# Patient Record
Sex: FEMALE | Employment: OTHER | URBAN - METROPOLITAN AREA
[De-identification: names, ages, dates, MRNs, and addresses within clinical notes are randomized per-mention and may not be internally consistent; named-entity substitution may affect disease eponyms.]

---

## 2022-11-04 ENCOUNTER — HOSPITAL ENCOUNTER (OUTPATIENT)
Age: 72
Setting detail: SPECIMEN
Discharge: HOME | End: 2022-11-04

## 2022-11-04 LAB
CHOLESTEROL, FASTING: 150 MG/DL
CHOLESTEROL/HDL RATIO: 3.4
HDLC SERPL-MCNC: 44 MG/DL
LDL CHOLESTEROL: 64 MG/DL (ref 0–130)
TRIGLYCERIDE, FASTING: 208 MG/DL

## 2024-01-30 ENCOUNTER — HOSPITAL ENCOUNTER (OUTPATIENT)
Age: 74
Setting detail: SPECIMEN
Discharge: HOME | End: 2024-01-30

## 2024-01-30 LAB
ALBUMIN SERPL-MCNC: 4.5 G/DL (ref 3.5–5.2)
ALBUMIN/GLOB SERPL: 1.7 {RATIO} (ref 1–2.5)
ALP SERPL-CCNC: 104 U/L (ref 35–104)
ALT SERPL-CCNC: 17 U/L (ref 5–33)
ANION GAP SERPL CALCULATED.3IONS-SCNC: 12 MMOL/L (ref 9–17)
AST SERPL-CCNC: 19 U/L
BASOPHILS # BLD: 0.05 K/UL (ref 0–0.2)
BASOPHILS NFR BLD: 1 % (ref 0–2)
BILIRUB DIRECT SERPL-MCNC: 0.2 MG/DL
BILIRUB INDIRECT SERPL-MCNC: 0.8 MG/DL (ref 0–1)
BILIRUB SERPL-MCNC: 1 MG/DL (ref 0.3–1.2)
BUN SERPL-MCNC: 24 MG/DL (ref 8–23)
CALCIUM SERPL-MCNC: 9.6 MG/DL (ref 8.6–10.4)
CHLORIDE SERPL-SCNC: 100 MMOL/L (ref 98–107)
CHOLEST SERPL-MCNC: 166 MG/DL
CHOLESTEROL/HDL RATIO: 3.7
CO2 SERPL-SCNC: 24 MMOL/L (ref 20–31)
CREAT SERPL-MCNC: 1.1 MG/DL (ref 0.5–0.9)
EOSINOPHIL # BLD: 0.08 K/UL (ref 0–0.44)
EOSINOPHILS RELATIVE PERCENT: 2 % (ref 1–4)
ERYTHROCYTE [DISTWIDTH] IN BLOOD BY AUTOMATED COUNT: 17.2 % (ref 11.8–14.4)
EST. AVERAGE GLUCOSE BLD GHB EST-MCNC: 108 MG/DL
GFR SERPL CREATININE-BSD FRML MDRD: 53 ML/MIN/1.73M2
GLUCOSE SERPL-MCNC: 105 MG/DL (ref 70–99)
HBA1C MFR BLD: 5.4 % (ref 4–6)
HCT VFR BLD AUTO: 42.8 % (ref 36.3–47.1)
HDLC SERPL-MCNC: 45 MG/DL
HGB BLD-MCNC: 13.1 G/DL (ref 11.9–15.1)
IMM GRANULOCYTES # BLD AUTO: <0.03 K/UL (ref 0–0.3)
IMM GRANULOCYTES NFR BLD: 0 %
LDLC SERPL CALC-MCNC: 76 MG/DL (ref 0–130)
LYMPHOCYTES NFR BLD: 1.32 K/UL (ref 1.1–3.7)
LYMPHOCYTES RELATIVE PERCENT: 26 % (ref 24–43)
MCH RBC QN AUTO: 25.9 PG (ref 25.2–33.5)
MCHC RBC AUTO-ENTMCNC: 30.6 G/DL (ref 28.4–34.8)
MCV RBC AUTO: 84.8 FL (ref 82.6–102.9)
MONOCYTES NFR BLD: 0.3 K/UL (ref 0.1–1.2)
MONOCYTES NFR BLD: 6 % (ref 3–12)
NEUTROPHILS NFR BLD: 65 % (ref 36–65)
NEUTS SEG NFR BLD: 3.37 K/UL (ref 1.5–8.1)
NRBC BLD-RTO: 0 PER 100 WBC
PLATELET # BLD AUTO: 194 K/UL (ref 138–453)
PMV BLD AUTO: 11.1 FL (ref 8.1–13.5)
POTASSIUM SERPL-SCNC: 4.7 MMOL/L (ref 3.7–5.3)
PROT SERPL-MCNC: 7.1 G/DL (ref 6.4–8.3)
RBC # BLD AUTO: 5.05 M/UL (ref 3.95–5.11)
RBC # BLD: ABNORMAL 10*6/UL
SODIUM SERPL-SCNC: 136 MMOL/L (ref 135–144)
TRIGL SERPL-MCNC: 227 MG/DL
TSH SERPL DL<=0.05 MIU/L-ACNC: 2.82 UIU/ML (ref 0.3–5)
WBC OTHER # BLD: 5.1 K/UL (ref 3.5–11.3)

## 2024-10-12 ENCOUNTER — APPOINTMENT (OUTPATIENT)
Dept: GENERAL RADIOLOGY | Age: 74
DRG: 481 | End: 2024-10-12
Payer: MEDICARE

## 2024-10-12 ENCOUNTER — HOSPITAL ENCOUNTER (INPATIENT)
Age: 74
LOS: 3 days | Discharge: NURSING, REHAB OR LONG-TERM CARE | DRG: 481 | End: 2024-10-15
Attending: EMERGENCY MEDICINE | Admitting: INTERNAL MEDICINE
Payer: MEDICARE

## 2024-10-12 ENCOUNTER — APPOINTMENT (OUTPATIENT)
Dept: CT IMAGING | Age: 74
DRG: 481 | End: 2024-10-12
Payer: MEDICARE

## 2024-10-12 DIAGNOSIS — R01.1 HEART MURMUR: ICD-10-CM
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
## 2024-10-12 DIAGNOSIS — S82.891A CLOSED FRACTURE OF RIGHT ANKLE, INITIAL ENCOUNTER: Primary | ICD-10-CM

## 2024-10-12 DIAGNOSIS — S72.044A CLOSED NONDISPLACED BASICERVICAL FRACTURE OF RIGHT FEMUR, INITIAL ENCOUNTER (HCC): ICD-10-CM

## 2024-10-12 PROBLEM — F41.9 ANXIETY: Status: ACTIVE | Noted: 2024-10-12

## 2024-10-12 PROBLEM — I25.10 ASCVD (ARTERIOSCLEROTIC CARDIOVASCULAR DISEASE): Status: ACTIVE | Noted: 2024-10-12

## 2024-10-12 PROBLEM — I47.10 SVT (SUPRAVENTRICULAR TACHYCARDIA) (HCC): Status: ACTIVE | Noted: 2024-10-12

## 2024-10-12 PROBLEM — S72.001A CLOSED FRACTURE OF NECK OF RIGHT FEMUR, INITIAL ENCOUNTER (HCC): Status: ACTIVE | Noted: 2024-10-12

## 2024-10-12 PROBLEM — G47.33 OBSTRUCTIVE SLEEP APNEA: Status: ACTIVE | Noted: 2024-10-12

## 2024-10-12 PROBLEM — I10 ESSENTIAL HYPERTENSION: Status: ACTIVE | Noted: 2024-10-12

## 2024-10-12 LAB
ANION GAP SERPL CALCULATED.3IONS-SCNC: 10 MMOL/L (ref 9–17)
BASOPHILS # BLD: 0.03 K/UL (ref 0–0.2)
BASOPHILS NFR BLD: 1 % (ref 0–2)
BUN SERPL-MCNC: 30 MG/DL (ref 8–23)
BUN/CREAT SERPL: 30 (ref 9–20)
CALCIUM SERPL-MCNC: 9.3 MG/DL (ref 8.6–10.4)
CHLORIDE SERPL-SCNC: 104 MMOL/L (ref 98–107)
CO2 SERPL-SCNC: 25 MMOL/L (ref 20–31)
CREAT SERPL-MCNC: 1 MG/DL (ref 0.5–0.9)
EOSINOPHIL # BLD: 0.04 K/UL (ref 0–0.44)
EOSINOPHILS RELATIVE PERCENT: 1 % (ref 1–4)
ERYTHROCYTE [DISTWIDTH] IN BLOOD BY AUTOMATED COUNT: 17.5 % (ref 11.8–14.4)
GFR, ESTIMATED: 59 ML/MIN/1.73M2
GLUCOSE SERPL-MCNC: 108 MG/DL (ref 70–99)
HCT VFR BLD AUTO: 36.7 % (ref 36.3–47.1)
HGB BLD-MCNC: 11.2 G/DL (ref 11.9–15.1)
IMM GRANULOCYTES # BLD AUTO: 0.01 K/UL (ref 0–0.3)
IMM GRANULOCYTES NFR BLD: 0 %
LYMPHOCYTES NFR BLD: 0.97 K/UL (ref 1.1–3.7)
LYMPHOCYTES RELATIVE PERCENT: 17 % (ref 24–43)
MCH RBC QN AUTO: 25.8 PG (ref 25.2–33.5)
MCHC RBC AUTO-ENTMCNC: 30.5 G/DL (ref 28.4–34.8)
MCV RBC AUTO: 84.6 FL (ref 82.6–102.9)
MONOCYTES NFR BLD: 0.31 K/UL (ref 0.1–1.2)
MONOCYTES NFR BLD: 5 % (ref 3–12)
NEUTROPHILS NFR BLD: 76 % (ref 36–65)
NEUTS SEG NFR BLD: 4.37 K/UL (ref 1.5–8.1)
NRBC BLD-RTO: 0 PER 100 WBC
PLATELET # BLD AUTO: 170 K/UL (ref 138–453)
PMV BLD AUTO: 10.4 FL (ref 8.1–13.5)
POTASSIUM SERPL-SCNC: 4.5 MMOL/L (ref 3.7–5.3)
RBC # BLD AUTO: 4.34 M/UL (ref 3.95–5.11)
RBC # BLD: ABNORMAL 10*6/UL
SODIUM SERPL-SCNC: 139 MMOL/L (ref 135–144)
WBC OTHER # BLD: 5.7 K/UL (ref 3.5–11.3)

## 2024-10-12 PROCEDURE — 99285 EMERGENCY DEPT VISIT HI MDM: CPT

## 2024-10-12 PROCEDURE — 6360000002 HC RX W HCPCS: Performed by: EMERGENCY MEDICINE

## 2024-10-12 PROCEDURE — 73610 X-RAY EXAM OF ANKLE: CPT

## 2024-10-12 PROCEDURE — 73030 X-RAY EXAM OF SHOULDER: CPT

## 2024-10-12 PROCEDURE — 6360000002 HC RX W HCPCS: Performed by: NURSE PRACTITIONER

## 2024-10-12 PROCEDURE — 1200000000 HC SEMI PRIVATE

## 2024-10-12 PROCEDURE — 2580000003 HC RX 258: Performed by: NURSE PRACTITIONER

## 2024-10-12 PROCEDURE — 73502 X-RAY EXAM HIP UNI 2-3 VIEWS: CPT

## 2024-10-12 PROCEDURE — 80048 BASIC METABOLIC PNL TOTAL CA: CPT

## 2024-10-12 PROCEDURE — 71045 X-RAY EXAM CHEST 1 VIEW: CPT

## 2024-10-12 PROCEDURE — 73552 X-RAY EXAM OF FEMUR 2/>: CPT

## 2024-10-12 PROCEDURE — 85025 COMPLETE CBC W/AUTO DIFF WBC: CPT

## 2024-10-12 PROCEDURE — 6370000000 HC RX 637 (ALT 250 FOR IP)

## 2024-10-12 PROCEDURE — 99223 1ST HOSP IP/OBS HIGH 75: CPT

## 2024-10-12 PROCEDURE — 73060 X-RAY EXAM OF HUMERUS: CPT

## 2024-10-12 RX ORDER — POLYETHYLENE GLYCOL 3350 17 G/17G
17 POWDER, FOR SOLUTION ORAL DAILY PRN
Status: DISCONTINUED | OUTPATIENT
Start: 2024-10-12 | End: 2024-10-15 | Stop reason: HOSPADM

## 2024-10-12 RX ORDER — POTASSIUM CHLORIDE 1500 MG/1
40 TABLET, EXTENDED RELEASE ORAL PRN
Status: DISCONTINUED | OUTPATIENT
Start: 2024-10-12 | End: 2024-10-15 | Stop reason: HOSPADM

## 2024-10-12 RX ORDER — SODIUM CHLORIDE 0.9 % (FLUSH) 0.9 %
10 SYRINGE (ML) INJECTION PRN
Status: DISCONTINUED | OUTPATIENT
Start: 2024-10-12 | End: 2024-10-15 | Stop reason: HOSPADM

## 2024-10-12 RX ORDER — METOPROLOL SUCCINATE 25 MG/1
25 TABLET, EXTENDED RELEASE ORAL DAILY
COMMUNITY

## 2024-10-12 RX ORDER — SODIUM CHLORIDE 9 MG/ML
INJECTION, SOLUTION INTRAVENOUS PRN
Status: DISCONTINUED | OUTPATIENT
Start: 2024-10-12 | End: 2024-10-15 | Stop reason: HOSPADM

## 2024-10-12 RX ORDER — CLOPIDOGREL BISULFATE 75 MG/1
75 TABLET ORAL DAILY
COMMUNITY

## 2024-10-12 RX ORDER — MONTELUKAST SODIUM 10 MG/1
10 TABLET ORAL NIGHTLY
Status: DISCONTINUED | OUTPATIENT
Start: 2024-10-12 | End: 2024-10-15 | Stop reason: HOSPADM

## 2024-10-12 RX ORDER — ASPIRIN 81 MG/1
81 TABLET ORAL DAILY
Status: DISCONTINUED | OUTPATIENT
Start: 2024-10-12 | End: 2024-10-15 | Stop reason: HOSPADM

## 2024-10-12 RX ORDER — ACETAMINOPHEN 650 MG/1
650 SUPPOSITORY RECTAL EVERY 6 HOURS PRN
Status: DISCONTINUED | OUTPATIENT
Start: 2024-10-12 | End: 2024-10-15 | Stop reason: HOSPADM

## 2024-10-12 RX ORDER — BUSPIRONE HYDROCHLORIDE 10 MG/1
10 TABLET ORAL 3 TIMES DAILY
Status: DISCONTINUED | OUTPATIENT
Start: 2024-10-12 | End: 2024-10-12

## 2024-10-12 RX ORDER — SODIUM CHLORIDE 0.9 % (FLUSH) 0.9 %
5-40 SYRINGE (ML) INJECTION EVERY 12 HOURS SCHEDULED
Status: DISCONTINUED | OUTPATIENT
Start: 2024-10-12 | End: 2024-10-15 | Stop reason: HOSPADM

## 2024-10-12 RX ORDER — ONDANSETRON 4 MG/1
4 TABLET, ORALLY DISINTEGRATING ORAL EVERY 8 HOURS PRN
Status: DISCONTINUED | OUTPATIENT
Start: 2024-10-12 | End: 2024-10-15 | Stop reason: HOSPADM

## 2024-10-12 RX ORDER — METOPROLOL SUCCINATE 25 MG/1
25 TABLET, EXTENDED RELEASE ORAL DAILY
Status: DISCONTINUED | OUTPATIENT
Start: 2024-10-12 | End: 2024-10-15 | Stop reason: HOSPADM

## 2024-10-12 RX ORDER — MORPHINE SULFATE 2 MG/ML
2 INJECTION, SOLUTION INTRAMUSCULAR; INTRAVENOUS ONCE
Status: COMPLETED | OUTPATIENT
Start: 2024-10-12 | End: 2024-10-12

## 2024-10-12 RX ORDER — MONTELUKAST SODIUM 10 MG/1
10 TABLET ORAL NIGHTLY
COMMUNITY

## 2024-10-12 RX ORDER — MORPHINE SULFATE 2 MG/ML
2 INJECTION, SOLUTION INTRAMUSCULAR; INTRAVENOUS EVERY 4 HOURS PRN
Status: DISCONTINUED | OUTPATIENT
Start: 2024-10-12 | End: 2024-10-15

## 2024-10-12 RX ORDER — ASPIRIN 81 MG/1
81 TABLET ORAL DAILY
COMMUNITY

## 2024-10-12 RX ORDER — BUSPIRONE HYDROCHLORIDE 10 MG/1
10 TABLET ORAL 2 TIMES DAILY
COMMUNITY

## 2024-10-12 RX ORDER — ENOXAPARIN SODIUM 100 MG/ML
30 INJECTION SUBCUTANEOUS 2 TIMES DAILY
Status: DISCONTINUED | OUTPATIENT
Start: 2024-10-12 | End: 2024-10-13

## 2024-10-12 RX ORDER — BUSPIRONE HYDROCHLORIDE 10 MG/1
10 TABLET ORAL 2 TIMES DAILY
Status: DISCONTINUED | OUTPATIENT
Start: 2024-10-13 | End: 2024-10-12

## 2024-10-12 RX ORDER — BUSPIRONE HYDROCHLORIDE 10 MG/1
10 TABLET ORAL 2 TIMES DAILY
Status: DISCONTINUED | OUTPATIENT
Start: 2024-10-12 | End: 2024-10-15 | Stop reason: HOSPADM

## 2024-10-12 RX ORDER — PANTOPRAZOLE SODIUM 40 MG/1
40 TABLET, DELAYED RELEASE ORAL DAILY
COMMUNITY

## 2024-10-12 RX ORDER — LORAZEPAM 0.5 MG/1
0.5 TABLET ORAL EVERY 6 HOURS PRN
Status: DISCONTINUED | OUTPATIENT
Start: 2024-10-12 | End: 2024-10-15 | Stop reason: HOSPADM

## 2024-10-12 RX ORDER — ROSUVASTATIN CALCIUM 5 MG/1
5 TABLET, COATED ORAL EVERY EVENING
COMMUNITY

## 2024-10-12 RX ORDER — ONDANSETRON 2 MG/ML
4 INJECTION INTRAMUSCULAR; INTRAVENOUS EVERY 6 HOURS PRN
Status: DISCONTINUED | OUTPATIENT
Start: 2024-10-12 | End: 2024-10-15 | Stop reason: HOSPADM

## 2024-10-12 RX ORDER — ENALAPRIL MALEATE AND HYDROCHLOROTHIAZIDE 10; 25 MG/1; MG/1
1 TABLET ORAL DAILY
Status: ON HOLD | COMMUNITY
End: 2024-10-15

## 2024-10-12 RX ORDER — ENALAPRIL MALEATE AND HYDROCHLOROTHIAZIDE 10; 25 MG/1; MG/1
1 TABLET ORAL DAILY
Status: DISCONTINUED | OUTPATIENT
Start: 2024-10-13 | End: 2024-10-13 | Stop reason: CLARIF

## 2024-10-12 RX ORDER — PANTOPRAZOLE SODIUM 40 MG/1
40 TABLET, DELAYED RELEASE ORAL DAILY
Status: DISCONTINUED | OUTPATIENT
Start: 2024-10-12 | End: 2024-10-15 | Stop reason: HOSPADM

## 2024-10-12 RX ORDER — MAGNESIUM SULFATE 1 G/100ML
1000 INJECTION INTRAVENOUS PRN
Status: DISCONTINUED | OUTPATIENT
Start: 2024-10-12 | End: 2024-10-15 | Stop reason: HOSPADM

## 2024-10-12 RX ORDER — POTASSIUM CHLORIDE 7.45 MG/ML
10 INJECTION INTRAVENOUS PRN
Status: DISCONTINUED | OUTPATIENT
Start: 2024-10-12 | End: 2024-10-15 | Stop reason: HOSPADM

## 2024-10-12 RX ORDER — CLOPIDOGREL BISULFATE 75 MG/1
75 TABLET ORAL DAILY
Status: DISCONTINUED | OUTPATIENT
Start: 2024-10-12 | End: 2024-10-14

## 2024-10-12 RX ORDER — ACETAMINOPHEN 325 MG/1
650 TABLET ORAL EVERY 6 HOURS PRN
Status: DISCONTINUED | OUTPATIENT
Start: 2024-10-12 | End: 2024-10-15 | Stop reason: HOSPADM

## 2024-10-12 RX ORDER — ROSUVASTATIN CALCIUM 5 MG/1
5 TABLET, COATED ORAL EVERY EVENING
Status: DISCONTINUED | OUTPATIENT
Start: 2024-10-12 | End: 2024-10-15 | Stop reason: HOSPADM

## 2024-10-12 RX ADMIN — PANTOPRAZOLE SODIUM 40 MG: 40 TABLET, DELAYED RELEASE ORAL at 22:14

## 2024-10-12 RX ADMIN — METOPROLOL SUCCINATE 25 MG: 25 TABLET, EXTENDED RELEASE ORAL at 22:13

## 2024-10-12 RX ADMIN — ROSUVASTATIN CALCIUM 5 MG: 5 TABLET, FILM COATED ORAL at 22:14

## 2024-10-12 RX ADMIN — MORPHINE SULFATE 2 MG: 2 INJECTION, SOLUTION INTRAMUSCULAR; INTRAVENOUS at 17:41

## 2024-10-12 RX ADMIN — SODIUM CHLORIDE, PRESERVATIVE FREE 10 ML: 5 INJECTION INTRAVENOUS at 20:51

## 2024-10-12 RX ADMIN — ENOXAPARIN SODIUM 30 MG: 100 INJECTION SUBCUTANEOUS at 20:57

## 2024-10-12 RX ADMIN — BUSPIRONE HYDROCHLORIDE 10 MG: 10 TABLET ORAL at 22:13

## 2024-10-12 RX ADMIN — MONTELUKAST 10 MG: 10 TABLET, FILM COATED ORAL at 22:13

## 2024-10-12 RX ADMIN — MORPHINE SULFATE 2 MG: 2 INJECTION, SOLUTION INTRAMUSCULAR; INTRAVENOUS at 20:49

## 2024-10-12 RX ADMIN — ASPIRIN 81 MG: 81 TABLET, COATED ORAL at 22:14

## 2024-10-12 ASSESSMENT — PAIN DESCRIPTION - LOCATION
LOCATION: LEG;ANKLE
LOCATION: LEG

## 2024-10-12 ASSESSMENT — PAIN SCALES - GENERAL
PAINLEVEL_OUTOF10: 10
PAINLEVEL_OUTOF10: 3
PAINLEVEL_OUTOF10: 8
PAINLEVEL_OUTOF10: 10

## 2024-10-12 ASSESSMENT — PAIN DESCRIPTION - ORIENTATION
ORIENTATION: RIGHT
ORIENTATION: RIGHT

## 2024-10-12 ASSESSMENT — PAIN DESCRIPTION - DESCRIPTORS
DESCRIPTORS: SHARP
DESCRIPTORS: ACHING;DISCOMFORT

## 2024-10-12 ASSESSMENT — PAIN - FUNCTIONAL ASSESSMENT: PAIN_FUNCTIONAL_ASSESSMENT: 0-10

## 2024-10-12 NOTE — ED PROVIDER NOTES
abnormality.   3. Mild degenerative change of the right AC joint.   Right hip:      1. Lucency at the right femoral neck.  Further evaluation with right hip   radiographs recommended to assess for a femoral neck fracture.   2. Mild bilateral hip osteoarthrosis.   3. Severe stool burden.   Right ankle:      1. Acute oblique distal fibular diaphyseal fracture.   2. Moderate soft tissue edema of the right leg.   3. Degenerative changes and sequela of remote trauma.   4. Severe plantar calcaneal spur.  Pes planus.         XR SHOULDER RIGHT (MIN 2 VIEWS)   Final Result   Chest:      1. Cardiomegaly.  No acute focal airspace consolidation.   2. Bilateral shoulder arthroplasty hardware.   Right shoulder:      1. Right shoulder arthroplasty.   2. Mild degenerative change of the right AC joint.   3. No significant periprosthetic lucency or acute osseous abnormality.   Right humerus:      1. Right shoulder arthroplasty.   2. No significant periprosthetic lucency or acute osseous abnormality.   3. Mild degenerative change of the right AC joint.   Right hip:      1. Lucency at the right femoral neck.  Further evaluation with right hip   radiographs recommended to assess for a femoral neck fracture.   2. Mild bilateral hip osteoarthrosis.   3. Severe stool burden.   Right ankle:      1. Acute oblique distal fibular diaphyseal fracture.   2. Moderate soft tissue edema of the right leg.   3. Degenerative changes and sequela of remote trauma.   4. Severe plantar calcaneal spur.  Pes planus.         XR ANKLE RIGHT (MIN 3 VIEWS)   Final Result   Chest:      1. Cardiomegaly.  No acute focal airspace consolidation.   2. Bilateral shoulder arthroplasty hardware.   Right shoulder:      1. Right shoulder arthroplasty.   2. Mild degenerative change of the right AC joint.   3. No significant periprosthetic lucency or acute osseous abnormality.   Right humerus:      1. Right shoulder arthroplasty.   2. No significant periprosthetic lucency

## 2024-10-13 PROBLEM — S82.891A CLOSED RIGHT ANKLE FRACTURE, INITIAL ENCOUNTER: Status: ACTIVE | Noted: 2024-10-13

## 2024-10-13 LAB
ANION GAP SERPL CALCULATED.3IONS-SCNC: 10 MMOL/L (ref 9–17)
BUN SERPL-MCNC: 25 MG/DL (ref 8–23)
BUN/CREAT SERPL: 23 (ref 9–20)
CALCIUM SERPL-MCNC: 8.8 MG/DL (ref 8.6–10.4)
CHLORIDE SERPL-SCNC: 105 MMOL/L (ref 98–107)
CO2 SERPL-SCNC: 23 MMOL/L (ref 20–31)
CREAT SERPL-MCNC: 1.1 MG/DL (ref 0.5–0.9)
GFR, ESTIMATED: 53 ML/MIN/1.73M2
GLUCOSE SERPL-MCNC: 117 MG/DL (ref 70–99)
INR PPP: 1.2
POTASSIUM SERPL-SCNC: 4.4 MMOL/L (ref 3.7–5.3)
PROTHROMBIN TIME: 14.8 SEC (ref 11.5–14.2)
SODIUM SERPL-SCNC: 138 MMOL/L (ref 135–144)

## 2024-10-13 PROCEDURE — 93005 ELECTROCARDIOGRAM TRACING: CPT | Performed by: INTERNAL MEDICINE

## 2024-10-13 PROCEDURE — 6360000002 HC RX W HCPCS: Performed by: NURSE PRACTITIONER

## 2024-10-13 PROCEDURE — 85610 PROTHROMBIN TIME: CPT

## 2024-10-13 PROCEDURE — 6370000000 HC RX 637 (ALT 250 FOR IP)

## 2024-10-13 PROCEDURE — 2580000003 HC RX 258: Performed by: NURSE PRACTITIONER

## 2024-10-13 PROCEDURE — 99232 SBSQ HOSP IP/OBS MODERATE 35: CPT | Performed by: NURSE PRACTITIONER

## 2024-10-13 PROCEDURE — 1200000000 HC SEMI PRIVATE

## 2024-10-13 PROCEDURE — 80048 BASIC METABOLIC PNL TOTAL CA: CPT

## 2024-10-13 PROCEDURE — 36415 COLL VENOUS BLD VENIPUNCTURE: CPT

## 2024-10-13 RX ORDER — SENNOSIDES A AND B 8.6 MG/1
2 TABLET, FILM COATED ORAL NIGHTLY
Status: DISCONTINUED | OUTPATIENT
Start: 2024-10-13 | End: 2024-10-15 | Stop reason: HOSPADM

## 2024-10-13 RX ORDER — OXYCODONE AND ACETAMINOPHEN 5; 325 MG/1; MG/1
1 TABLET ORAL EVERY 6 HOURS PRN
Status: DISCONTINUED | OUTPATIENT
Start: 2024-10-13 | End: 2024-10-15 | Stop reason: HOSPADM

## 2024-10-13 RX ORDER — DOCUSATE SODIUM 100 MG/1
100 CAPSULE, LIQUID FILLED ORAL 2 TIMES DAILY
Status: DISCONTINUED | OUTPATIENT
Start: 2024-10-13 | End: 2024-10-15 | Stop reason: HOSPADM

## 2024-10-13 RX ORDER — OXYCODONE AND ACETAMINOPHEN 5; 325 MG/1; MG/1
2 TABLET ORAL EVERY 6 HOURS PRN
Status: DISCONTINUED | OUTPATIENT
Start: 2024-10-13 | End: 2024-10-15 | Stop reason: HOSPADM

## 2024-10-13 RX ORDER — HYDROCHLOROTHIAZIDE 25 MG/1
25 TABLET ORAL DAILY
Status: DISCONTINUED | OUTPATIENT
Start: 2024-10-13 | End: 2024-10-15 | Stop reason: HOSPADM

## 2024-10-13 RX ORDER — ENOXAPARIN SODIUM 100 MG/ML
30 INJECTION SUBCUTANEOUS 2 TIMES DAILY
Status: DISCONTINUED | OUTPATIENT
Start: 2024-10-13 | End: 2024-10-14

## 2024-10-13 RX ORDER — ENALAPRIL MALEATE 10 MG/1
10 TABLET ORAL DAILY
Status: DISCONTINUED | OUTPATIENT
Start: 2024-10-13 | End: 2024-10-15 | Stop reason: HOSPADM

## 2024-10-13 RX ADMIN — MORPHINE SULFATE 2 MG: 2 INJECTION, SOLUTION INTRAMUSCULAR; INTRAVENOUS at 01:00

## 2024-10-13 RX ADMIN — ENALAPRIL MALEATE 10 MG: 10 TABLET ORAL at 09:17

## 2024-10-13 RX ADMIN — ASPIRIN 81 MG: 81 TABLET, COATED ORAL at 09:17

## 2024-10-13 RX ADMIN — ENOXAPARIN SODIUM 30 MG: 100 INJECTION SUBCUTANEOUS at 09:17

## 2024-10-13 RX ADMIN — SODIUM CHLORIDE, PRESERVATIVE FREE 10 ML: 5 INJECTION INTRAVENOUS at 09:18

## 2024-10-13 RX ADMIN — HYDROCHLOROTHIAZIDE 25 MG: 25 TABLET ORAL at 09:17

## 2024-10-13 RX ADMIN — BUSPIRONE HYDROCHLORIDE 10 MG: 10 TABLET ORAL at 20:25

## 2024-10-13 RX ADMIN — BUSPIRONE HYDROCHLORIDE 10 MG: 10 TABLET ORAL at 09:16

## 2024-10-13 RX ADMIN — MONTELUKAST 10 MG: 10 TABLET, FILM COATED ORAL at 20:25

## 2024-10-13 RX ADMIN — METOPROLOL SUCCINATE 25 MG: 25 TABLET, EXTENDED RELEASE ORAL at 09:16

## 2024-10-13 RX ADMIN — SENNOSIDES 17.2 MG: 8.6 TABLET, FILM COATED ORAL at 20:25

## 2024-10-13 RX ADMIN — DOCUSATE SODIUM 100 MG: 100 CAPSULE, LIQUID FILLED ORAL at 20:25

## 2024-10-13 RX ADMIN — SODIUM CHLORIDE, PRESERVATIVE FREE 10 ML: 5 INJECTION INTRAVENOUS at 22:54

## 2024-10-13 RX ADMIN — OXYCODONE AND ACETAMINOPHEN 2 TABLET: 5; 325 TABLET ORAL at 13:12

## 2024-10-13 RX ADMIN — ROSUVASTATIN CALCIUM 5 MG: 5 TABLET, FILM COATED ORAL at 20:25

## 2024-10-13 RX ADMIN — PANTOPRAZOLE SODIUM 40 MG: 40 TABLET, DELAYED RELEASE ORAL at 06:12

## 2024-10-13 ASSESSMENT — PAIN DESCRIPTION - FREQUENCY: FREQUENCY: CONTINUOUS

## 2024-10-13 ASSESSMENT — PAIN SCALES - GENERAL
PAINLEVEL_OUTOF10: 8
PAINLEVEL_OUTOF10: 7
PAINLEVEL_OUTOF10: 4
PAINLEVEL_OUTOF10: 3
PAINLEVEL_OUTOF10: 3

## 2024-10-13 ASSESSMENT — PAIN DESCRIPTION - DESCRIPTORS
DESCRIPTORS: ACHING;DISCOMFORT
DESCRIPTORS: ACHING;DISCOMFORT

## 2024-10-13 ASSESSMENT — PAIN DESCRIPTION - PAIN TYPE: TYPE: ACUTE PAIN

## 2024-10-13 ASSESSMENT — PAIN DESCRIPTION - ONSET: ONSET: ON-GOING

## 2024-10-13 ASSESSMENT — PAIN DESCRIPTION - LOCATION
LOCATION: ANKLE
LOCATION: HIP;ANKLE;LEG

## 2024-10-13 ASSESSMENT — PAIN DESCRIPTION - ORIENTATION
ORIENTATION: RIGHT
ORIENTATION: RIGHT

## 2024-10-13 ASSESSMENT — PAIN - FUNCTIONAL ASSESSMENT: PAIN_FUNCTIONAL_ASSESSMENT: PREVENTS OR INTERFERES SOME ACTIVE ACTIVITIES AND ADLS

## 2024-10-13 NOTE — CARE COORDINATION
Case Management Assessment  Initial Evaluation    Date/Time of Evaluation: 10/13/2024 3:36 PM  Assessment Completed by: CHAN CRUZ RN    If patient is discharged prior to next notation, then this note serves as note for discharge by case management.    Patient Name: Leticia George                   YOB: 1950  Diagnosis: Closed fracture of neck of right femur, initial encounter ([facility]) [S72.001A]  Closed fracture of right ankle, initial encounter [S82.891A]  Fall, initial encounter [W19.XXXA]  Closed nondisplaced basicervical fracture of right femur, initial encounter ([facility]) [S72.044A]                   Date / Time: 10/12/2024  2:16 PM    Patient Admission Status: Inpatient   Readmission Risk (Low < 19, Mod (19-27), High > 27): Readmission Risk Score: 13.4    Current PCP: Cortney Chaudhari MD  PCP verified by CM? Yes    Chart Reviewed: Yes      History Provided by: Patient  Patient Orientation: Alert and Oriented, Person, Place, Situation, Self    Patient Cognition: Alert    Hospitalization in the last 30 days (Readmission):  No    If yes, Readmission Assessment in  Navigator will be completed.    Advance Directives:      Code Status: Full Code   Patient's Primary Decision Maker is: Legal Next of Kin      Discharge Planning:    Patient lives with: Alone Type of Home: House  Primary Care Giver: Self  Patient Support Systems include: Family Members, Parent   Current Financial resources: None  Current community resources: None  Current services prior to admission: Durable Medical Equipment            Current DME: Walker, Cane (Raised toilet seat)            Type of Home Care services:  OT, PT, Skilled Therapy, Nursing Services, Aide Services    ADLS  Prior functional level: Independent in ADLs/IADLs  Current functional level: Assistance with the following:, Bathing, Dressing, Toileting, Cooking, Housework, Shopping, Mobility    PT AM-PAC:   /24  OT AM-PAC:   /24    Family can provide assistance

## 2024-10-13 NOTE — CONSULTS
Section of Cardiology   Consult Note      Reason for Consult: Preop clearance, CAD  Requesting Physician: Isai Lagunas MD    CHIEF COMPLAINT: Status post mechanical fall    History Obtained From:  patient, electronic medical record, patient's nurse    HISTORY OF PRESENT ILLNESS:      The patient is a 74 y.o. female with significant past medical history of CAD, obesity, PCI to the RCA who presents with mechanical fall without loss of consciousness.  The patient states that she felt weak in her knees and she fell to the floor and was found to have a hip fracture and was seen by Ortho service and clearance requested because the patient scheduled for surgery tomorrow.  Patient has limited activities mainly due to her overweight.  She has knees and shoulders problems.  She is known to have obstructive sleep apnea but did not tolerate using the mask.  She follows with [facility] but did not see her cardiologist lately.  She states she supposed to call and get appointment.  No recent cardiac testings.  Patient denies chest pain.  She admits to have chronic lower extremities edema and she tells me that she has lymphedema.  No PND or orthopnea.  No palpitation.  Previous diagnostic testing for coronary artery disease includes: cardiac catheterization, echocardiogram, persantine thallium.   Previous history of cardiac disease includes: ischemic heart disease, coronary artery disease, and coronary artery stent.    Coronary artery disease risk factors include: advanced age (older than 55 for men, 65 for women), dyslipidemia, obesity (BMI >= 30 kg/m2), and sedentary lifestyle.    Past Medical History:    Past Medical History:   Diagnosis Date    Atherosclerotic heart disease of native coronary artery without angina pectoris     Hyperlipidemia     Hypertension     Morbid obesity with body mass index (BMI) of 50.0 to 59.9 in adult     FRANNY (obstructive sleep apnea)     Presence of drug-eluting stent in right coronary artery

## 2024-10-13 NOTE — H&P
after mechanical fall where she states she was attempting to step out of the truck and missed her step. She denies hitting her head but does complain of right leg and right shoulder discomfort.  She does have a past medical history significant for CAD s/p RCA stenting x 4, hypertension, hyperlipidemia, SVT and an extensive Ortho history including artificial knee and shoulder repairs.  Upon arrival to the ED her blood pressure was 141/67 with a heart rate of 52.    Blood work in the ER but 9 with a hemoglobin of 11.2 and a creatinine of 1.0.  Chest x-ray returned with cardiomegaly.  Right shoulder and humerus x-ray revealed degenerative changes with arthroplasty.  Right hip revealed right femoral neck fracture, severe stool burden.  Right ankle x-ray revealed acute oblique distal fibular diaphyseal fracture and soft tissue edema and severe plantar calcaneal spur.  Both podiatry and orthopedics were consulted in the emergency department.  The patient was splinted under the recommendations with plans for hospital admission.  We were then asked to admit the patient to medicine services.    During interview, patient very tearful on examination and endorses feelings of anxiety as she is concerned about caring for her mother at discharge.  Additionally she voiced a history of RCA dissection.  Unable to find documentation of dissection via chart review however cardiology was consulted considering risk.    Past Medical History:     Past Medical History:   Diagnosis Date    Atherosclerotic heart disease of native coronary artery without angina pectoris     Hyperlipidemia     Hypertension     Morbid obesity with body mass index (BMI) of 50.0 to 59.9 in adult     FRANNY (obstructive sleep apnea)     Presence of drug-eluting stent in right coronary artery     SVT (supraventricular tachycardia) (HCC)         Past Surgical History:     No past surgical history on file.     Medications Prior to Admission:     Prior to Admission

## 2024-10-13 NOTE — DISCHARGE INSTRUCTIONS
Orthopaedic Instructions:  -Weight bearing status: toe touch weightbearing with the right leg in CAM Boot. OK to remove CAM boot for ankle range of motion and hygiene, but always wear CAM boot when ambulating.   -Do not remove Optifoam bandage (the large sealed \"Band-aid\"-like dressing) until your follow-up date in clinic. It is okay to shower with the Optifoam bandage. Should it fall off, replace with band-aids or gauze & tape until dry. It is still okay to shower if it falls off, but avoid baths and underwater submersion.  -Ice (20 minutes on and off 1 hour) to reduce swelling and throbbing pain.  -Call the office or come to Emergency Room if signs of infection appear (hot, swollen, red, draining pus, fever).  -Take medications as prescribed.  -Wean off narcotics (percocet/norco) as soon as possible. Do not take tylenol if still taking narcotics.  -Follow up with Dr. Mccracken's office 10/28/24 at 7:30am . Call [phone] to schedule/confirm or with any questions/concerns.        Podiatry:  Medications:  Take your prescriptions as directed  You are receiving medications for pain  If your pain is not severe then you may take the non-prescription medication that you normally take for aches and pains ie Tylenol and Ibuprofen (alternating)  You may resume your regularly scheduled medications (unless otherwise directed)  If any side effects or adverse reactions occur, discontinue the medication and contact your doctor.  Review the patient drug information that is provided before you take any medication    Ambulation and Activity:  Use assistive devices per PT/OT as needed  You may not put weight on the injured foot.  Leave posterior splint intact until follow up. Do not walk on the right foot.  Avoid stairs.  Do not lift or move heavy objects  Do not drive until cleared by your physician    Bandage and Wound Care Instructions:  Keep bandage clean and dry  Do NOT remove dressing/ splint  DO NOT get wet  Please use shower

## 2024-10-13 NOTE — DISCHARGE INSTR - COC
oriented    IV Access:  - None    Nursing Mobility/ADLs:  Walking   Dependent  Transfer  Assisted  Bathing  Assisted  Dressing  Assisted  Toileting  Assisted  Feeding  Independent  Med Admin  Assisted  Med Delivery   whole    Wound Care Documentation and Therapy:        Elimination:  Continence:   Bowel: Yes  Bladder: Yes  Urinary Catheter: None   Colostomy/Ileostomy/Ileal Conduit: No       Date of Last BM: 10/10/2024  No intake or output data in the 24 hours ending 10/13/24 0916  No intake/output data recorded.    Safety Concerns:     History of Falls (last 30 days) and At Risk for Falls    Impairments/Disabilities:      None    Nutrition Therapy:  Current Nutrition Therapy:   - Oral Diet:  General    Routes of Feeding: Oral  Liquids: No Restrictions  Daily Fluid Restriction: no  Last Modified Barium Swallow with Video (Video Swallowing Test): not done    Treatments at the Time of Hospital Discharge:   Respiratory Treatments:   Oxygen Therapy:  is not on home oxygen therapy.  Ventilator:    - No ventilator support    Rehab Therapies: Physical Therapy and Occupational Therapy  Weight Bearing Status/Restrictions: Touchdown weight bearing (10-25 lbs) only on right leg, with cam boot  Other Medical Equipment (for information only, NOT a DME order):  walker and cam boot  Other Treatments: CHG daily to incision site. OK to remove CAM boot for ankle ROM and hygiene.      Patient's personal belongings (please select all that are sent with patient):  Cell phone, clothing     RN SIGNATURE:  Electronically signed by Silvia Jonas RN on 10/15/24 at 8:55 AM EDT    CASE MANAGEMENT/SOCIAL WORK SECTION    Inpatient Status Date: ***    Readmission Risk Assessment Score:  Readmission Risk              Risk of Unplanned Readmission:  12           Discharging to Facility/ Agency   Name: [facility]  Address:[facility]  Phone:[phone]  Fax:[phone]      / signature: Electronically

## 2024-10-14 ENCOUNTER — ANESTHESIA (OUTPATIENT)
Dept: OPERATING ROOM | Age: 74
DRG: 481 | End: 2024-10-14
Payer: MEDICARE

## 2024-10-14 ENCOUNTER — ANESTHESIA EVENT (OUTPATIENT)
Dept: OPERATING ROOM | Age: 74
DRG: 481 | End: 2024-10-14
Payer: MEDICARE

## 2024-10-14 ENCOUNTER — APPOINTMENT (OUTPATIENT)
Dept: GENERAL RADIOLOGY | Age: 74
DRG: 481 | End: 2024-10-14
Payer: MEDICARE

## 2024-10-14 ENCOUNTER — APPOINTMENT (OUTPATIENT)
Age: 74
DRG: 481 | End: 2024-10-14
Payer: MEDICARE

## 2024-10-14 PROBLEM — S82.891A CLOSED FRACTURE OF RIGHT ANKLE: Status: ACTIVE | Noted: 2024-10-14

## 2024-10-14 PROBLEM — S82.61XA CLOSED LOW LATERAL MALLEOLUS FRACTURE, RIGHT, INITIAL ENCOUNTER: Status: ACTIVE | Noted: 2024-10-14

## 2024-10-14 LAB
ABO + RH BLD: NORMAL
ARM BAND NUMBER: NORMAL
BLOOD BANK SAMPLE EXPIRATION: NORMAL
BLOOD GROUP ANTIBODIES SERPL: NEGATIVE
ECHO AO ROOT DIAM: 2.8 CM
ECHO AO ROOT INDEX: 1.15 CM/M2
ECHO AV PEAK GRADIENT: 9 MMHG
ECHO AV PEAK VELOCITY: 1.5 M/S
ECHO BSA: 2.62 M2
ECHO EST RA PRESSURE: 3 MMHG
ECHO LA AREA 4C: 22.3 CM2
ECHO LA DIAMETER INDEX: 1.68 CM/M2
ECHO LA DIAMETER: 4.1 CM
ECHO LA MAJOR AXIS: 6.7 CM
ECHO LA TO AORTIC ROOT RATIO: 1.46
ECHO LA VOL MOD A4C: 60 ML (ref 22–52)
ECHO LA VOLUME INDEX MOD A4C: 25 ML/M2 (ref 16–34)
ECHO LV E' LATERAL VELOCITY: 11.4 CM/S
ECHO LV E' SEPTAL VELOCITY: 6 CM/S
ECHO LV EDV A4C: 92 ML
ECHO LV EDV INDEX A4C: 38 ML/M2
ECHO LV EJECTION FRACTION A4C: 70 %
ECHO LV EJECTION FRACTION BIPLANE: 70 % (ref 55–100)
ECHO LV ESV A4C: 28 ML
ECHO LV ESV INDEX A4C: 11 ML/M2
ECHO LV FRACTIONAL SHORTENING: 34 % (ref 28–44)
ECHO LV INTERNAL DIMENSION DIASTOLE INDEX: 2.05 CM/M2
ECHO LV INTERNAL DIMENSION DIASTOLIC: 5 CM (ref 3.9–5.3)
ECHO LV INTERNAL DIMENSION SYSTOLIC INDEX: 1.35 CM/M2
ECHO LV INTERNAL DIMENSION SYSTOLIC: 3.3 CM
ECHO LV IVSD: 1.2 CM (ref 0.6–0.9)
ECHO LV MASS 2D: 233.7 G (ref 67–162)
ECHO LV MASS INDEX 2D: 95.8 G/M2 (ref 43–95)
ECHO LV POSTERIOR WALL DIASTOLIC: 1.2 CM (ref 0.6–0.9)
ECHO LV RELATIVE WALL THICKNESS RATIO: 0.48
ECHO MV A VELOCITY: 0.96 M/S
ECHO MV E DECELERATION TIME (DT): 331 MS
ECHO MV E VELOCITY: 0.67 M/S
ECHO MV E/A RATIO: 0.7
ECHO MV E/E' LATERAL: 5.88
ECHO MV E/E' RATIO (AVERAGED): 8.52
ECHO MV E/E' SEPTAL: 11.17
ECHO RIGHT VENTRICULAR SYSTOLIC PRESSURE (RVSP): 13 MMHG
ECHO TV REGURGITANT MAX VELOCITY: 1.62 M/S
ECHO TV REGURGITANT PEAK GRADIENT: 10 MMHG
EKG ATRIAL RATE: 56 BPM
EKG P AXIS: 28 DEGREES
EKG P-R INTERVAL: 178 MS
EKG Q-T INTERVAL: 404 MS
EKG QRS DURATION: 72 MS
EKG QTC CALCULATION (BAZETT): 389 MS
EKG R AXIS: 30 DEGREES
EKG T AXIS: 22 DEGREES
EKG VENTRICULAR RATE: 56 BPM

## 2024-10-14 PROCEDURE — C1769 GUIDE WIRE: HCPCS | Performed by: ORTHOPAEDIC SURGERY

## 2024-10-14 PROCEDURE — 2580000003 HC RX 258: Performed by: NURSE ANESTHETIST, CERTIFIED REGISTERED

## 2024-10-14 PROCEDURE — 3600000012 HC SURGERY LEVEL 2 ADDTL 15MIN: Performed by: ORTHOPAEDIC SURGERY

## 2024-10-14 PROCEDURE — 86850 RBC ANTIBODY SCREEN: CPT

## 2024-10-14 PROCEDURE — 6360000002 HC RX W HCPCS: Performed by: NURSE ANESTHETIST, CERTIFIED REGISTERED

## 2024-10-14 PROCEDURE — 86901 BLOOD TYPING SEROLOGIC RH(D): CPT

## 2024-10-14 PROCEDURE — 2709999900 HC NON-CHARGEABLE SUPPLY: Performed by: ORTHOPAEDIC SURGERY

## 2024-10-14 PROCEDURE — 6370000000 HC RX 637 (ALT 250 FOR IP): Performed by: CHIROPRACTOR

## 2024-10-14 PROCEDURE — 2720000010 HC SURG SUPPLY STERILE: Performed by: ORTHOPAEDIC SURGERY

## 2024-10-14 PROCEDURE — 7100000001 HC PACU RECOVERY - ADDTL 15 MIN: Performed by: ORTHOPAEDIC SURGERY

## 2024-10-14 PROCEDURE — 0QS634Z REPOSITION RIGHT UPPER FEMUR WITH INTERNAL FIXATION DEVICE, PERCUTANEOUS APPROACH: ICD-10-PCS | Performed by: ORTHOPAEDIC SURGERY

## 2024-10-14 PROCEDURE — 3700000001 HC ADD 15 MINUTES (ANESTHESIA): Performed by: ORTHOPAEDIC SURGERY

## 2024-10-14 PROCEDURE — 7100000000 HC PACU RECOVERY - FIRST 15 MIN: Performed by: ORTHOPAEDIC SURGERY

## 2024-10-14 PROCEDURE — 2500000003 HC RX 250 WO HCPCS: Performed by: NURSE ANESTHETIST, CERTIFIED REGISTERED

## 2024-10-14 PROCEDURE — 2580000003 HC RX 258: Performed by: CHIROPRACTOR

## 2024-10-14 PROCEDURE — 3700000000 HC ANESTHESIA ATTENDED CARE: Performed by: ORTHOPAEDIC SURGERY

## 2024-10-14 PROCEDURE — 99232 SBSQ HOSP IP/OBS MODERATE 35: CPT | Performed by: STUDENT IN AN ORGANIZED HEALTH CARE EDUCATION/TRAINING PROGRAM

## 2024-10-14 PROCEDURE — C1713 ANCHOR/SCREW BN/BN,TIS/BN: HCPCS | Performed by: ORTHOPAEDIC SURGERY

## 2024-10-14 PROCEDURE — 36415 COLL VENOUS BLD VENIPUNCTURE: CPT

## 2024-10-14 PROCEDURE — 6360000002 HC RX W HCPCS: Performed by: ANESTHESIOLOGY

## 2024-10-14 PROCEDURE — 86900 BLOOD TYPING SEROLOGIC ABO: CPT

## 2024-10-14 PROCEDURE — 6370000000 HC RX 637 (ALT 250 FOR IP): Performed by: NURSE PRACTITIONER

## 2024-10-14 PROCEDURE — 6370000000 HC RX 637 (ALT 250 FOR IP)

## 2024-10-14 PROCEDURE — 93306 TTE W/DOPPLER COMPLETE: CPT

## 2024-10-14 PROCEDURE — 2580000003 HC RX 258: Performed by: NURSE PRACTITIONER

## 2024-10-14 PROCEDURE — 6360000002 HC RX W HCPCS: Performed by: CHIROPRACTOR

## 2024-10-14 PROCEDURE — 3600000002 HC SURGERY LEVEL 2 BASE: Performed by: ORTHOPAEDIC SURGERY

## 2024-10-14 PROCEDURE — 1200000000 HC SEMI PRIVATE

## 2024-10-14 PROCEDURE — 73502 X-RAY EXAM HIP UNI 2-3 VIEWS: CPT

## 2024-10-14 DEVICE — SCREW BNE L90MM DIA6.5MM THRD L32MM S STL CANN HEX SOCK: Type: IMPLANTABLE DEVICE | Site: HIP | Status: FUNCTIONAL

## 2024-10-14 DEVICE — SCREW BNE L100MM DIA6.5MM THRD L32MM S STL CANN HEX SOCK: Type: IMPLANTABLE DEVICE | Site: HIP | Status: FUNCTIONAL

## 2024-10-14 DEVICE — SCREW BNE L95MM DIA6.5MM THRD L32MM HD DIA7.9MM S STL CANC: Type: IMPLANTABLE DEVICE | Site: HIP | Status: FUNCTIONAL

## 2024-10-14 RX ORDER — OXYCODONE HYDROCHLORIDE 5 MG/1
5 TABLET ORAL
Status: DISCONTINUED | OUTPATIENT
Start: 2024-10-14 | End: 2024-10-14 | Stop reason: HOSPADM

## 2024-10-14 RX ORDER — NALOXONE HYDROCHLORIDE 0.4 MG/ML
INJECTION, SOLUTION INTRAMUSCULAR; INTRAVENOUS; SUBCUTANEOUS PRN
Status: DISCONTINUED | OUTPATIENT
Start: 2024-10-14 | End: 2024-10-14 | Stop reason: HOSPADM

## 2024-10-14 RX ORDER — EPHEDRINE SULFATE/0.9% NACL/PF 25 MG/5 ML
SYRINGE (ML) INTRAVENOUS
Status: DISCONTINUED | OUTPATIENT
Start: 2024-10-14 | End: 2024-10-14 | Stop reason: SDUPTHER

## 2024-10-14 RX ORDER — SODIUM CHLORIDE 0.9 % (FLUSH) 0.9 %
5-40 SYRINGE (ML) INJECTION EVERY 12 HOURS SCHEDULED
Status: DISCONTINUED | OUTPATIENT
Start: 2024-10-14 | End: 2024-10-14 | Stop reason: HOSPADM

## 2024-10-14 RX ORDER — SODIUM CHLORIDE, SODIUM LACTATE, POTASSIUM CHLORIDE, CALCIUM CHLORIDE 600; 310; 30; 20 MG/100ML; MG/100ML; MG/100ML; MG/100ML
INJECTION, SOLUTION INTRAVENOUS
Status: DISCONTINUED | OUTPATIENT
Start: 2024-10-14 | End: 2024-10-14 | Stop reason: SDUPTHER

## 2024-10-14 RX ORDER — MIDAZOLAM HYDROCHLORIDE 1 MG/ML
INJECTION INTRAMUSCULAR; INTRAVENOUS
Status: DISCONTINUED | OUTPATIENT
Start: 2024-10-14 | End: 2024-10-14 | Stop reason: SDUPTHER

## 2024-10-14 RX ORDER — CEFAZOLIN SODIUM 1 G/3ML
INJECTION, POWDER, FOR SOLUTION INTRAMUSCULAR; INTRAVENOUS
Status: DISCONTINUED | OUTPATIENT
Start: 2024-10-14 | End: 2024-10-14 | Stop reason: SDUPTHER

## 2024-10-14 RX ORDER — LIDOCAINE HYDROCHLORIDE 20 MG/ML
INJECTION, SOLUTION EPIDURAL; INFILTRATION; INTRACAUDAL; PERINEURAL
Status: DISCONTINUED | OUTPATIENT
Start: 2024-10-14 | End: 2024-10-14 | Stop reason: SDUPTHER

## 2024-10-14 RX ORDER — SODIUM CHLORIDE 0.9 % (FLUSH) 0.9 %
5-40 SYRINGE (ML) INJECTION PRN
Status: DISCONTINUED | OUTPATIENT
Start: 2024-10-14 | End: 2024-10-14 | Stop reason: HOSPADM

## 2024-10-14 RX ORDER — DEXAMETHASONE SODIUM PHOSPHATE 10 MG/ML
INJECTION INTRAMUSCULAR; INTRAVENOUS
Status: DISCONTINUED | OUTPATIENT
Start: 2024-10-14 | End: 2024-10-14 | Stop reason: SDUPTHER

## 2024-10-14 RX ORDER — SODIUM CHLORIDE 9 MG/ML
INJECTION, SOLUTION INTRAVENOUS PRN
Status: DISCONTINUED | OUTPATIENT
Start: 2024-10-14 | End: 2024-10-14 | Stop reason: HOSPADM

## 2024-10-14 RX ORDER — ROCURONIUM BROMIDE 10 MG/ML
INJECTION, SOLUTION INTRAVENOUS
Status: DISCONTINUED | OUTPATIENT
Start: 2024-10-14 | End: 2024-10-14 | Stop reason: SDUPTHER

## 2024-10-14 RX ORDER — HYDROMORPHONE HYDROCHLORIDE 1 MG/ML
0.5 INJECTION, SOLUTION INTRAMUSCULAR; INTRAVENOUS; SUBCUTANEOUS EVERY 5 MIN PRN
Status: DISCONTINUED | OUTPATIENT
Start: 2024-10-14 | End: 2024-10-14 | Stop reason: HOSPADM

## 2024-10-14 RX ORDER — PROPOFOL 10 MG/ML
INJECTION, EMULSION INTRAVENOUS
Status: DISCONTINUED | OUTPATIENT
Start: 2024-10-14 | End: 2024-10-14 | Stop reason: SDUPTHER

## 2024-10-14 RX ORDER — ONDANSETRON 2 MG/ML
INJECTION INTRAMUSCULAR; INTRAVENOUS
Status: DISCONTINUED | OUTPATIENT
Start: 2024-10-14 | End: 2024-10-14 | Stop reason: SDUPTHER

## 2024-10-14 RX ORDER — METOCLOPRAMIDE HYDROCHLORIDE 5 MG/ML
10 INJECTION INTRAMUSCULAR; INTRAVENOUS
Status: DISCONTINUED | OUTPATIENT
Start: 2024-10-14 | End: 2024-10-14 | Stop reason: HOSPADM

## 2024-10-14 RX ORDER — TRANEXAMIC ACID 100 MG/ML
INJECTION, SOLUTION INTRAVENOUS
Status: DISPENSED
Start: 2024-10-14 | End: 2024-10-15

## 2024-10-14 RX ORDER — FENTANYL CITRATE 50 UG/ML
INJECTION, SOLUTION INTRAMUSCULAR; INTRAVENOUS
Status: DISCONTINUED | OUTPATIENT
Start: 2024-10-14 | End: 2024-10-14 | Stop reason: SDUPTHER

## 2024-10-14 RX ORDER — FENTANYL CITRATE 50 UG/ML
25 INJECTION, SOLUTION INTRAMUSCULAR; INTRAVENOUS EVERY 5 MIN PRN
Status: DISCONTINUED | OUTPATIENT
Start: 2024-10-14 | End: 2024-10-14 | Stop reason: HOSPADM

## 2024-10-14 RX ORDER — BUPIVACAINE HYDROCHLORIDE AND EPINEPHRINE 5; 5 MG/ML; UG/ML
INJECTION, SOLUTION EPIDURAL; INTRACAUDAL; PERINEURAL
Status: DISPENSED
Start: 2024-10-14 | End: 2024-10-15

## 2024-10-14 RX ORDER — HALOPERIDOL 5 MG/ML
1 INJECTION INTRAMUSCULAR
Status: DISCONTINUED | OUTPATIENT
Start: 2024-10-14 | End: 2024-10-14 | Stop reason: HOSPADM

## 2024-10-14 RX ADMIN — HYDROMORPHONE HYDROCHLORIDE 0.5 MG: 1 INJECTION, SOLUTION INTRAMUSCULAR; INTRAVENOUS; SUBCUTANEOUS at 17:23

## 2024-10-14 RX ADMIN — PROPOFOL 80 MG: 10 INJECTION, EMULSION INTRAVENOUS at 16:56

## 2024-10-14 RX ADMIN — BUSPIRONE HYDROCHLORIDE 10 MG: 10 TABLET ORAL at 21:27

## 2024-10-14 RX ADMIN — ENALAPRIL MALEATE 10 MG: 10 TABLET ORAL at 08:41

## 2024-10-14 RX ADMIN — FENTANYL CITRATE 100 MCG: 50 INJECTION INTRAMUSCULAR; INTRAVENOUS at 15:48

## 2024-10-14 RX ADMIN — DOCUSATE SODIUM 100 MG: 100 CAPSULE, LIQUID FILLED ORAL at 21:27

## 2024-10-14 RX ADMIN — LORAZEPAM 0.5 MG: 0.5 TABLET ORAL at 18:15

## 2024-10-14 RX ADMIN — LORAZEPAM 0.5 MG: 0.5 TABLET ORAL at 08:41

## 2024-10-14 RX ADMIN — SODIUM CHLORIDE, POTASSIUM CHLORIDE, SODIUM LACTATE AND CALCIUM CHLORIDE: 600; 310; 30; 20 INJECTION, SOLUTION INTRAVENOUS at 15:38

## 2024-10-14 RX ADMIN — METOPROLOL SUCCINATE 25 MG: 25 TABLET, EXTENDED RELEASE ORAL at 08:41

## 2024-10-14 RX ADMIN — Medication 3000 MG: at 23:41

## 2024-10-14 RX ADMIN — EPHEDRINE SULFATE 5 MG: 5 INJECTION INTRAVENOUS at 15:53

## 2024-10-14 RX ADMIN — SENNOSIDES 17.2 MG: 8.6 TABLET, FILM COATED ORAL at 21:27

## 2024-10-14 RX ADMIN — DOCUSATE SODIUM 100 MG: 100 CAPSULE, LIQUID FILLED ORAL at 08:41

## 2024-10-14 RX ADMIN — DEXAMETHASONE SODIUM PHOSPHATE 10 MG: 10 INJECTION INTRAMUSCULAR; INTRAVENOUS at 15:54

## 2024-10-14 RX ADMIN — PANTOPRAZOLE SODIUM 40 MG: 40 TABLET, DELAYED RELEASE ORAL at 06:05

## 2024-10-14 RX ADMIN — HYDROMORPHONE HYDROCHLORIDE 0.5 MG: 1 INJECTION, SOLUTION INTRAMUSCULAR; INTRAVENOUS; SUBCUTANEOUS at 17:36

## 2024-10-14 RX ADMIN — LIDOCAINE HYDROCHLORIDE 80 MG: 20 INJECTION, SOLUTION EPIDURAL; INFILTRATION; INTRACAUDAL; PERINEURAL at 15:48

## 2024-10-14 RX ADMIN — ROSUVASTATIN CALCIUM 5 MG: 5 TABLET, FILM COATED ORAL at 18:16

## 2024-10-14 RX ADMIN — EPHEDRINE SULFATE 10 MG: 5 INJECTION INTRAVENOUS at 16:00

## 2024-10-14 RX ADMIN — EPHEDRINE SULFATE 10 MG: 5 INJECTION INTRAVENOUS at 16:32

## 2024-10-14 RX ADMIN — CEFAZOLIN 3 G: 1 INJECTION, POWDER, FOR SOLUTION INTRAMUSCULAR; INTRAVENOUS at 15:55

## 2024-10-14 RX ADMIN — OXYCODONE AND ACETAMINOPHEN 2 TABLET: 5; 325 TABLET ORAL at 04:42

## 2024-10-14 RX ADMIN — SODIUM CHLORIDE, PRESERVATIVE FREE 10 ML: 5 INJECTION INTRAVENOUS at 08:42

## 2024-10-14 RX ADMIN — SUGAMMADEX 500 MG: 100 INJECTION, SOLUTION INTRAVENOUS at 16:58

## 2024-10-14 RX ADMIN — MIDAZOLAM 2 MG: 1 INJECTION INTRAMUSCULAR; INTRAVENOUS at 15:38

## 2024-10-14 RX ADMIN — ONDANSETRON 4 MG: 2 INJECTION, SOLUTION INTRAMUSCULAR; INTRAVENOUS at 16:50

## 2024-10-14 RX ADMIN — HYDROCHLOROTHIAZIDE 25 MG: 25 TABLET ORAL at 08:40

## 2024-10-14 RX ADMIN — BUSPIRONE HYDROCHLORIDE 10 MG: 10 TABLET ORAL at 08:41

## 2024-10-14 RX ADMIN — ROCURONIUM BROMIDE 50 MG: 10 INJECTION, SOLUTION INTRAVENOUS at 15:49

## 2024-10-14 RX ADMIN — PROPOFOL 120 MG: 10 INJECTION, EMULSION INTRAVENOUS at 15:48

## 2024-10-14 RX ADMIN — MONTELUKAST 10 MG: 10 TABLET, FILM COATED ORAL at 21:27

## 2024-10-14 RX ADMIN — ACETAMINOPHEN 650 MG: 325 TABLET ORAL at 08:41

## 2024-10-14 RX ADMIN — SODIUM CHLORIDE, PRESERVATIVE FREE 10 ML: 5 INJECTION INTRAVENOUS at 21:27

## 2024-10-14 RX ADMIN — SODIUM CHLORIDE, PRESERVATIVE FREE 10 ML: 5 INJECTION INTRAVENOUS at 23:41

## 2024-10-14 ASSESSMENT — ENCOUNTER SYMPTOMS
SHORTNESS OF BREATH: 0
CONSTIPATION: 1
CHEST TIGHTNESS: 0
ABDOMINAL PAIN: 0
VOMITING: 0
DIARRHEA: 0
NAUSEA: 0
COUGH: 0

## 2024-10-14 ASSESSMENT — PAIN SCALES - GENERAL
PAINLEVEL_OUTOF10: 3
PAINLEVEL_OUTOF10: 4
PAINLEVEL_OUTOF10: 8
PAINLEVEL_OUTOF10: 0
PAINLEVEL_OUTOF10: 7
PAINLEVEL_OUTOF10: 8
PAINLEVEL_OUTOF10: 0
PAINLEVEL_OUTOF10: 0
PAINLEVEL_OUTOF10: 2
PAINLEVEL_OUTOF10: 3
PAINLEVEL_OUTOF10: 4

## 2024-10-14 ASSESSMENT — PAIN DESCRIPTION - DESCRIPTORS
DESCRIPTORS: ACHING;DISCOMFORT
DESCRIPTORS: ACHING
DESCRIPTORS: PATIENT UNABLE TO DESCRIBE

## 2024-10-14 ASSESSMENT — PAIN DESCRIPTION - ORIENTATION
ORIENTATION: RIGHT;LEFT;ANTERIOR
ORIENTATION: RIGHT

## 2024-10-14 ASSESSMENT — PAIN - FUNCTIONAL ASSESSMENT
PAIN_FUNCTIONAL_ASSESSMENT: ACTIVITIES ARE NOT PREVENTED
PAIN_FUNCTIONAL_ASSESSMENT: FACE, LEGS, ACTIVITY, CRY, AND CONSOLABILITY (FLACC)

## 2024-10-14 ASSESSMENT — PAIN DESCRIPTION - LOCATION
LOCATION: HEAD
LOCATION: ANKLE;HIP
LOCATION: LEG

## 2024-10-14 NOTE — CARE COORDINATION
Social work: spoke to pt who decided on 1. [facility] and 2 [facility] are her top 2 choices for rehab Referrals sent to both. Will need rodriguez when ready. Tyra mcleanw

## 2024-10-14 NOTE — BRIEF OP NOTE
Brief Postoperative Note      Patient: Leticia George  YOB: 1950  MRN: [id number]    Date of Procedure: 10/14/2024    Pre-Op Diagnosis Codes:      Closed right femoral neck fracture, right lateral malleolus fracture    Post-Op Diagnosis: Closed right femoral neck fracture, right lateral malleolus fracture       Procedure(s):  CLOSED REDUCTION PERCUTANEOUS SCREW FIXATION RIGHT HIP fracture, nonoperative nonmanipulative treatment right lateral malleolus fracture    Surgeon(s):  Paddy Mccracken DO    Assistant:  Resident: Dalton Oneal DO    Anesthesia: General    Estimated Blood Loss (mL): 10    Complications: None    Specimens:   * No specimens in log *    Implants:  * No implants in log *      Drains:   External Urinary Catheter (Active)   Site Assessment Clean,dry & intact 10/14/24 1200   Placement Replaced 10/13/24 2104   Securement Method Other (Comment) 10/13/24 2104   Catheter Care Catheter/Wick replaced 10/14/24 1040   Perineal Care Yes 10/14/24 1040   Output (mL) 450 mL 10/14/24 1040       Findings:  Infection Present At Time Of Surgery (PATOS) (choose all levels that have infection present):  No infection present  Other Findings: Essentially nondisplaced right femoral neck fracture, nondisplaced right lateral malleolus fracture    Electronically signed by Paddy Mccracken DO on 10/14/2024 at 4:56 PM

## 2024-10-14 NOTE — ANESTHESIA POSTPROCEDURE EVALUATION
Department of Anesthesiology  Postprocedure Note    Patient: Leticia George  MRN: [id number]  YOB: 1950  Date of evaluation: 10/14/2024    Procedure Summary       Date: 10/14/24 Room / Location: [address]    Anesthesia Start: 1538 Anesthesia Stop: 1711    Procedure: CLOSED REDUCTION PERCUTANEOUS SCREW FIXATION RIGHT HIP (Right: Hip) Diagnosis:       Type I or II open fracture of right hip, sequela      (Type I or II open fracture of right hip, sequela [S72.001S])    Surgeons: Paddy Mccracken DO Responsible Provider: Mariely Smith MD    Anesthesia Type: General ASA Status: 4            Anesthesia Type: General    Iraida Phase I: Iraida Score: 10    Iraida Phase II:      Anesthesia Post Evaluation    Patient location during evaluation: PACU  Patient participation: complete - patient participated  Level of consciousness: awake  Airway patency: patent  Nausea & Vomiting: no nausea  Cardiovascular status: blood pressure returned to baseline  Respiratory status: acceptable  Hydration status: euvolemic  Comments: Multimodal analgesia pain management as indicated by procedure  Multimodal analgesia pain management approach  Pain management: adequate    No notable events documented.

## 2024-10-14 NOTE — CARE COORDINATION
Social work [facility] accepted pt for admission after surgery when ready. 2nd choice [facility] has not responded.   Will need rodriguez at discharge. Needs PT prior to discharge.  [facility] for report [phone]  FAx: [phone]  Tyra cornejo

## 2024-10-14 NOTE — ANESTHESIA PRE PROCEDURE
Department of Anesthesiology  Preprocedure Note       Name:  Leticia George   Age:  74 y.o.  :  1950                                          MRN:  [id number]         Date:  10/14/2024      Surgeon: Surgeon(s):  Paddy Mccracken DO    Procedure: Procedure(s):  HIP PINNING 6.5/7.3 SCREWS SYNTHES VS MEDACTA    Medications prior to admission:   Prior to Admission medications    Medication Sig Start Date End Date Taking? Authorizing Provider   metoprolol succinate (TOPROL XL) 25 MG extended release tablet Take 1 tablet by mouth daily   Yes Provider, MD Mary   aspirin 81 MG EC tablet Take 1 tablet by mouth daily   Yes Provider, MD Mary   clopidogrel (PLAVIX) 75 MG tablet Take 1 tablet by mouth daily   Yes Provider, MD Mary   pantoprazole (PROTONIX) 40 MG tablet Take 1 tablet by mouth daily   Yes ProviderMary MD   busPIRone (BUSPAR) 10 MG tablet Take 1 tablet by mouth 2 times daily   Yes Provider, MD Mary   rosuvastatin (CRESTOR) 5 MG tablet Take 1 tablet by mouth every evening   Yes Provider, MD Mary   montelukast (SINGULAIR) 10 MG tablet Take 1 tablet by mouth nightly   Yes Provider, MD Mary   enalapril-hydroCHLOROthiazide (VASERETIC) 10-25 MG per tablet Take 1 tablet by mouth daily   Yes Provider, MD Mary       Current medications:    Current Facility-Administered Medications   Medication Dose Route Frequency Provider Last Rate Last Admin   • enalapril (VASOTEC) tablet 10 mg  10 mg Oral Daily Quin Romo APRN - CNP   10 mg at 10/13/24 0917    And   • hydroCHLOROthiazide (HYDRODIURIL) tablet 25 mg  25 mg Oral Daily Quin Romo APRN - CNP   25 mg at 10/13/24 0917   • oxyCODONE-acetaminophen (PERCOCET) 5-325 MG per tablet 1 tablet  1 tablet Oral Q6H PRN Quin Romo APRN - CNP        Or   • oxyCODONE-acetaminophen (PERCOCET) 5-325 MG per tablet 2 tablet  2 tablet Oral Q6H PRN Quin Romo APRN - CNP   2 tablet at 10/14/24

## 2024-10-14 NOTE — CONSULTS
Orthopedic Surgery Consult                       CC/Reason for consult: Right hip fracture, right ankle fracture    HPI:      The patient is a 74 y.o. female who I was asked to see in consultation by the hospitalist service for right hip fracture right ankle fracture sustained on 10/12/2024.  The patient reportedly fell to the ground while trying to get into a car.  She was transported to the hospital for further evaluation and found to have a minimally displaced femoral neck fracture and a nondisplaced lateral malleolus fracture on the right.  She also notes some right shoulder pain that slowly improving this morning.  She denies hitting her head or loss of consciousness.    Past Medical History:    Past Medical History:   Diagnosis Date    Atherosclerotic heart disease of native coronary artery without angina pectoris     Hyperlipidemia     Hypertension     Morbid obesity with body mass index (BMI) of 50.0 to 59.9 in adult     FRANNY (obstructive sleep apnea)     Presence of drug-eluting stent in right coronary artery     SVT (supraventricular tachycardia) (HCC)        Past Surgical History:    No past surgical history on file.    Medications Prior to Admission:   Prior to Admission medications    Medication Sig Start Date End Date Taking? Authorizing Provider   metoprolol succinate (TOPROL XL) 25 MG extended release tablet Take 1 tablet by mouth daily   Yes ProviderMary MD   aspirin 81 MG EC tablet Take 1 tablet by mouth daily   Yes ProviderMary MD   clopidogrel (PLAVIX) 75 MG tablet Take 1 tablet by mouth daily   Yes Mary Quintero MD   pantoprazole (PROTONIX) 40 MG tablet Take 1 tablet by mouth daily   Yes Mary Quintero MD   busPIRone (BUSPAR) 10 MG tablet Take 1 tablet by mouth 2 times daily   Yes Mary Quintero MD   rosuvastatin (CRESTOR) 5 MG tablet Take 1 tablet by mouth every evening   Yes Mary Quintero MD   montelukast (SINGULAIR) 10 MG tablet Take 1 tablet by

## 2024-10-14 NOTE — OP NOTE
Operative Note      Patient: Leticia George  YOB: 1950  MRN: [id number]    Date of Procedure: 10/14/2024    Pre-Op Diagnosis Codes:   Closed right femoral neck fracture, right lateral malleolus fracture    Post-Op Diagnosis: Closed right femoral neck fracture, right lateral malleolus fracture       Procedure(s):  CLOSED REDUCTION PERCUTANEOUS SCREW FIXATION RIGHT HIP fracture, nonoperative nonmanipulative treatment right lateral malleolus fracture    Surgeon(s):  Paddy Mccracken DO    Assistant:   Resident: Dalton Oneal DO    Anesthesia: General    Estimated Blood Loss (mL): 10    Complications: None    Specimens:   * No specimens in log *    Implants:  * No implants in log *      Drains:   External Urinary Catheter (Active)   Site Assessment Clean,dry & intact 10/14/24 1200   Placement Replaced 10/13/24 2104   Securement Method Other (Comment) 10/13/24 2104   Catheter Care Catheter/Wick replaced 10/14/24 1040   Perineal Care Yes 10/14/24 1040   Output (mL) 450 mL 10/14/24 1040       Findings:  Infection Present At Time Of Surgery (PATOS) (choose all levels that have infection present):  No infection present  Other Findings: Essentially nondisplaced right femoral neck fracture, nondisplaced right lateral malleolus fracture    Detailed Description of Procedure:     Leticia George is a 74-year-old female who presented to [facility] surgical department for closed reduction and percutaneous screw fixation right hip fracture and nonoperative treatment right ankle fracture.  The patient sustained these on 10/12/2024 after a fall from a standing height.  She had been found in clinical radiographic evaluation of a right femoral neck fracture and a right ankle fracture.  She was admitted to the hospital for further stabilization and workup and was felt to be medically stable for surgical intervention at this time.  It was felt that the patient was best served with operative

## 2024-10-15 VITALS
HEART RATE: 60 BPM | HEIGHT: 66 IN | OXYGEN SATURATION: 96 % | WEIGHT: 293 LBS | SYSTOLIC BLOOD PRESSURE: 133 MMHG | RESPIRATION RATE: 18 BRPM | DIASTOLIC BLOOD PRESSURE: 64 MMHG | BODY MASS INDEX: 47.09 KG/M2 | TEMPERATURE: 97.5 F

## 2024-10-15 PROCEDURE — 97535 SELF CARE MNGMENT TRAINING: CPT

## 2024-10-15 PROCEDURE — 6370000000 HC RX 637 (ALT 250 FOR IP): Performed by: CHIROPRACTOR

## 2024-10-15 PROCEDURE — 6360000002 HC RX W HCPCS: Performed by: CHIROPRACTOR

## 2024-10-15 PROCEDURE — 97162 PT EVAL MOD COMPLEX 30 MIN: CPT

## 2024-10-15 PROCEDURE — 2580000003 HC RX 258: Performed by: CHIROPRACTOR

## 2024-10-15 PROCEDURE — 97530 THERAPEUTIC ACTIVITIES: CPT

## 2024-10-15 PROCEDURE — 97166 OT EVAL MOD COMPLEX 45 MIN: CPT

## 2024-10-15 PROCEDURE — 6370000000 HC RX 637 (ALT 250 FOR IP): Performed by: INTERNAL MEDICINE

## 2024-10-15 PROCEDURE — 6360000002 HC RX W HCPCS: Performed by: INTERNAL MEDICINE

## 2024-10-15 PROCEDURE — 97110 THERAPEUTIC EXERCISES: CPT

## 2024-10-15 PROCEDURE — 99239 HOSP IP/OBS DSCHRG MGMT >30: CPT | Performed by: INTERNAL MEDICINE

## 2024-10-15 RX ORDER — ENALAPRIL MALEATE 10 MG/1
10 TABLET ORAL DAILY
COMMUNITY

## 2024-10-15 RX ORDER — ZINC GLUCONATE 50 MG
50 TABLET ORAL DAILY
COMMUNITY

## 2024-10-15 RX ORDER — HYDROCHLOROTHIAZIDE 25 MG/1
25 TABLET ORAL DAILY
COMMUNITY

## 2024-10-15 RX ORDER — TRAMADOL HYDROCHLORIDE 50 MG/1
50 TABLET ORAL EVERY 8 HOURS PRN
COMMUNITY

## 2024-10-15 RX ORDER — BUPROPION HYDROCHLORIDE 150 MG/1
150 TABLET ORAL EVERY MORNING
COMMUNITY

## 2024-10-15 RX ORDER — CLOPIDOGREL BISULFATE 75 MG/1
75 TABLET ORAL DAILY
Status: DISCONTINUED | OUTPATIENT
Start: 2024-10-15 | End: 2024-10-15 | Stop reason: HOSPADM

## 2024-10-15 RX ORDER — ENOXAPARIN SODIUM 100 MG/ML
30 INJECTION SUBCUTANEOUS 2 TIMES DAILY
DISCHARGE
Start: 2024-10-15

## 2024-10-15 RX ORDER — OXYCODONE AND ACETAMINOPHEN 5; 325 MG/1; MG/1
1-2 TABLET ORAL EVERY 6 HOURS PRN
Status: SHIPPED | OUTPATIENT
Start: 2024-10-15 | End: 2024-10-22

## 2024-10-15 RX ORDER — DIMENHYDRINATE 50 MG
1 TABLET ORAL DAILY
COMMUNITY

## 2024-10-15 RX ORDER — ENOXAPARIN SODIUM 100 MG/ML
30 INJECTION SUBCUTANEOUS 2 TIMES DAILY
Status: DISCONTINUED | OUTPATIENT
Start: 2024-10-15 | End: 2024-10-15 | Stop reason: HOSPADM

## 2024-10-15 RX ADMIN — ENALAPRIL MALEATE 10 MG: 10 TABLET ORAL at 08:08

## 2024-10-15 RX ADMIN — SODIUM CHLORIDE, PRESERVATIVE FREE 10 ML: 5 INJECTION INTRAVENOUS at 08:08

## 2024-10-15 RX ADMIN — ENOXAPARIN SODIUM 30 MG: 100 INJECTION SUBCUTANEOUS at 09:52

## 2024-10-15 RX ADMIN — ASPIRIN 81 MG: 81 TABLET, COATED ORAL at 08:08

## 2024-10-15 RX ADMIN — PANTOPRAZOLE SODIUM 40 MG: 40 TABLET, DELAYED RELEASE ORAL at 05:46

## 2024-10-15 RX ADMIN — Medication 3000 MG: at 08:08

## 2024-10-15 RX ADMIN — DOCUSATE SODIUM 100 MG: 100 CAPSULE, LIQUID FILLED ORAL at 08:08

## 2024-10-15 RX ADMIN — METOPROLOL SUCCINATE 25 MG: 25 TABLET, EXTENDED RELEASE ORAL at 08:08

## 2024-10-15 RX ADMIN — HYDROCHLOROTHIAZIDE 25 MG: 25 TABLET ORAL at 08:08

## 2024-10-15 RX ADMIN — CLOPIDOGREL BISULFATE 75 MG: 75 TABLET ORAL at 09:53

## 2024-10-15 RX ADMIN — OXYCODONE AND ACETAMINOPHEN 1 TABLET: 5; 325 TABLET ORAL at 09:52

## 2024-10-15 RX ADMIN — BUSPIRONE HYDROCHLORIDE 10 MG: 10 TABLET ORAL at 08:08

## 2024-10-15 ASSESSMENT — PAIN DESCRIPTION - ORIENTATION: ORIENTATION: RIGHT

## 2024-10-15 ASSESSMENT — PAIN DESCRIPTION - LOCATION: LOCATION: HIP;SHOULDER

## 2024-10-15 ASSESSMENT — PAIN - FUNCTIONAL ASSESSMENT: PAIN_FUNCTIONAL_ASSESSMENT: ACTIVITIES ARE NOT PREVENTED

## 2024-10-15 ASSESSMENT — PAIN SCALES - GENERAL
PAINLEVEL_OUTOF10: 2
PAINLEVEL_OUTOF10: 4

## 2024-10-15 ASSESSMENT — PAIN DESCRIPTION - DESCRIPTORS: DESCRIPTORS: ACHING

## 2024-10-15 NOTE — CARE COORDINATION
Social work: Patient to dc to [facility] via [facility]  at 1:30PM.  # for RN report: [phone]. Completed JAIME faxed to [phone].  Informed RN, pt, and facility of dc time, agreeable to plan.

## 2024-10-15 NOTE — DISCHARGE SUMMARY
daily     oxyCODONE-acetaminophen 5-325 MG per tablet  Commonly known as: PERCOCET  Take 1-2 tablets by mouth every 6 hours as needed for Pain for up to 7 days. 1 tab for moderate pain and 2 tabs for severe pain Max Daily Amount: 8 tablets            CONTINUE taking these medications      aspirin 81 MG EC tablet     busPIRone 10 MG tablet  Commonly known as: BUSPAR     clopidogrel 75 MG tablet  Commonly known as: PLAVIX     Crestor 5 MG tablet  Generic drug: rosuvastatin     enalapril-hydroCHLOROthiazide 10-25 MG per tablet  Commonly known as: VASERETIC     metoprolol succinate 25 MG extended release tablet  Commonly known as: TOPROL XL     montelukast 10 MG tablet  Commonly known as: SINGULAIR     pantoprazole 40 MG tablet  Commonly known as: PROTONIX               Where to Get Your Medications        You can get these medications from any pharmacy    Bring a paper prescription for each of these medications  oxyCODONE-acetaminophen 5-325 MG per tablet       Information about where to get these medications is not yet available    Ask your nurse or doctor about these medications  enoxaparin Sodium 30 MG/0.3ML injection         No discharge procedures on file.    Time Spent on discharge is  32 mins in patient examination, evaluation, counseling as well as medication reconciliation, prescriptions for required medications, discharge plan and follow up.    Electronically signed by   Danilo Mccain DO  10/15/2024  8:29 AM      Thank you Cortney Mckoy MD for the opportunity to be involved in this patient's care.

## 2024-10-15 NOTE — PLAN OF CARE
Plan of care note  Foot and Ankle Surgery     Plan of care      Patient has oblique nondisplaced distal fibular diaphyseal fracture. No surgical intervention at this time. Patient stable for discharge from podiatry's standpoint. NWB with assistive devices per PT/OT. Ice behind the knee and elevate right ankle above heart level with pillows. Patient to follow up with Dr. Price within 1 week of discharge. Patient to leave posterior splint clean, dry, and intact until follow up appt. Podiatry to sign off at this time. Follow up and discharge instructions in chart. Please page on call resident with any questions.    Avelina Conde DPM  Foot and Ankle Surgery   10/13/24 at 9:07 AM              
  Orthopedic Brief Plan of Care     Patient:  Leticia George  YOB: 1950     74 y.o. female    Impression 74 y.o. female being seen for:    - Right femoral neck fracture  - Right lateral malleolus fracture    DOS: 10/14/24 (POD#0)  - Closed reduction percutaneous screw fixation right femoral neck fracture  - Closed treatment of right lateral malleolus fracture    Plan  - No plan for further Orthopaedic intervention at this time.  - WB status: TTWB RLE; please maintain CAM boot to RLE when ambulating. OK to remove CAM boot for ankle ROM and hygiene.  - Dressings about RLE, please maintain and reinforce as needed  - Primary Team: Internal Medicine    - Antibiotics:  Post-Op Ancef   - DVT ppx: OK to resume POD#1 from Orthopaedic perspective  - F/u Post-Op HgB  - F/u Post-Op Radiographs   - Ice for Pain and Swelling  - PT/OT to evaluate, appreciate recommendations.  - Dispo: OK to discharge from orthopedic perspective, pending PT/OT post-op evaluation and recs, and when medically appropriate.  - F/u Dr. Mccracken's office 10/28/24 at 7:30am   - Please page the On-Call-Ortho-Resident with any questions.  _________________________________  Dalton Oneal D.O.  Orthopedic Surgery Resident, PGY-3  [place], [place]    
  Problem: Discharge Planning  Goal: Discharge to home or other facility with appropriate resources  10/13/2024 1808 by Lyly Castelan RN  Outcome: Progressing  Flowsheets (Taken 10/13/2024 0917)  Discharge to home or other facility with appropriate resources: Identify barriers to discharge with patient and caregiver     Problem: Pain  Goal: Verbalizes/displays adequate comfort level or baseline comfort level  10/13/2024 1808 by Lyly Castelan RN  Outcome: Progressing  Flowsheets (Taken 10/13/2024 1808)  Verbalizes/displays adequate comfort level or baseline comfort level:   Encourage patient to monitor pain and request assistance   Assess pain using appropriate pain scale   Administer analgesics based on type and severity of pain and evaluate response     Problem: Safety - Adult  Goal: Free from fall injury  10/13/2024 1808 by Lyly Castelan RN  Outcome: Progressing  Flowsheets (Taken 10/13/2024 1808)  Free From Fall Injury: Instruct family/caregiver on patient safety     Problem: ABCDS Injury Assessment  Goal: Absence of physical injury  10/13/2024 1808 by Lyly Castelan RN  Outcome: Progressing  Flowsheets (Taken 10/13/2024 1808)  Absence of Physical Injury: Implement safety measures based on patient assessment     Problem: Neurosensory - Adult  Goal: Achieves stable or improved neurological status  10/13/2024 1808 by Lyly Castelan RN  Outcome: Progressing  Flowsheets (Taken 10/13/2024 0917)  Achieves stable or improved neurological status: Assess for and report changes in neurological status     Problem: Musculoskeletal - Adult  Goal: Return mobility to safest level of function  10/13/2024 1808 by Lyly Castelan RN  Outcome: Progressing  Flowsheets (Taken 10/13/2024 0917)  Return Mobility to Safest Level of Function: Assess patient stability and activity tolerance for standing, transferring and ambulating with or without assistive devices     Problem: Musculoskeletal - Adult  Goal: Maintain proper alignment of 
  Problem: Discharge Planning  Goal: Discharge to home or other facility with appropriate resources  10/15/2024 1023 by Radha Gramajo RN  Outcome: Progressing  Flowsheets  Taken 10/15/2024 1023  Discharge to home or other facility with appropriate resources: Identify barriers to discharge with patient and caregiver  Taken 10/15/2024 0821  Discharge to home or other facility with appropriate resources: Identify barriers to discharge with patient and caregiver     Problem: Pain  Goal: Verbalizes/displays adequate comfort level or baseline comfort level  10/15/2024 1023 by Radha Gramajo RN  Outcome: Progressing  Flowsheets (Taken 10/15/2024 1023)  Verbalizes/displays adequate comfort level or baseline comfort level: Encourage patient to monitor pain and request assistance     Problem: Neurosensory - Adult  Goal: Achieves stable or improved neurological status  10/15/2024 1023 by Radha Gramajo RN  Outcome: Progressing  10/15/2024 0325 by Briseida Scott RN  Outcome: Progressing     Problem: Musculoskeletal - Adult  Goal: Return mobility to safest level of function  10/15/2024 1023 by Radha Gramajo RN  Outcome: Progressing  Flowsheets  Taken 10/15/2024 1023  Return Mobility to Safest Level of Function:   Assess patient stability and activity tolerance for standing, transferring and ambulating with or without assistive devices   Assist with transfers and ambulation using safe patient handling equipment as needed  Taken 10/15/2024 0821  Return Mobility to Safest Level of Function: Assess patient stability and activity tolerance for standing, transferring and ambulating with or without assistive devices     Problem: Musculoskeletal - Adult  Goal: Maintain proper alignment of affected body part  10/15/2024 1023 by Radha Gramajo RN  Outcome: Progressing  Flowsheets (Taken 10/15/2024 1023)  Maintain proper alignment of affected body part: Support and protect limb and body alignment per provider's orders     
  Problem: Discharge Planning  Goal: Discharge to home or other facility with appropriate resources  10/15/2024 1400 by Radha Gramajo, RN  Outcome: Adequate for Discharge     Problem: Pain  Goal: Verbalizes/displays adequate comfort level or baseline comfort level  10/15/2024 1400 by Radha Gramajo, RN  Outcome: Adequate for Discharge     
  Problem: Discharge Planning  Goal: Discharge to home or other facility with appropriate resources  Outcome: Progressing     Problem: Pain  Goal: Verbalizes/displays adequate comfort level or baseline comfort level  Outcome: Progressing  Flowsheets (Taken 10/15/2024 0325)  Verbalizes/displays adequate comfort level or baseline comfort level:   Encourage patient to monitor pain and request assistance   Administer analgesics based on type and severity of pain and evaluate response   Consider cultural and social influences on pain and pain management   Assess pain using appropriate pain scale   Implement non-pharmacological measures as appropriate and evaluate response     Problem: Safety - Adult  Goal: Free from fall injury  Outcome: Progressing  Flowsheets (Taken 10/15/2024 0325)  Free From Fall Injury:   Instruct family/caregiver on patient safety   Based on caregiver fall risk screen, instruct family/caregiver to ask for assistance with transferring infant if caregiver noted to have fall risk factors     Problem: Neurosensory - Adult  Goal: Achieves stable or improved neurological status  Outcome: Progressing     Problem: Musculoskeletal - Adult  Goal: Return mobility to safest level of function  Outcome: Progressing  Goal: Maintain proper alignment of affected body part  Outcome: Progressing  Goal: Return ADL status to a safe level of function  Outcome: Progressing     
Pt admitted for fracture of neck of right femur, pt fell while walking, pain managed with IV morphine, pt has had no complaints of nausea, will continue to monitor.   Problem: Discharge Planning  Goal: Discharge to home or other facility with appropriate resources  Outcome: Progressing  Flowsheets (Taken 10/12/2024 2026)  Discharge to home or other facility with appropriate resources:   Identify barriers to discharge with patient and caregiver   Arrange for needed discharge resources and transportation as appropriate   Identify discharge learning needs (meds, wound care, etc)   Refer to discharge planning if patient needs post-hospital services based on physician order or complex needs related to functional status, cognitive ability or social support system     Problem: Pain  Goal: Verbalizes/displays adequate comfort level or baseline comfort level  Outcome: Progressing     Problem: Safety - Adult  Goal: Free from fall injury  Outcome: Progressing     Problem: ABCDS Injury Assessment  Goal: Absence of physical injury  Outcome: Progressing     Problem: Neurosensory - Adult  Goal: Achieves stable or improved neurological status  Outcome: Progressing  Flowsheets (Taken 10/12/2024 2026)  Achieves stable or improved neurological status: Assess for and report changes in neurological status     Problem: Musculoskeletal - Adult  Goal: Return mobility to safest level of function  Outcome: Progressing  Flowsheets (Taken 10/12/2024 2026)  Return Mobility to Safest Level of Function:   Assess patient stability and activity tolerance for standing, transferring and ambulating with or without assistive devices   Assist with transfers and ambulation using safe patient handling equipment as needed   Ensure adequate protection for wounds/incisions during mobilization   Obtain physical therapy/occupational therapy consults as needed   Instruct patient/family in ordered activity level  Goal: Maintain proper alignment of affected body 
Pt has been NPO since midnight for surgery today.   Problem: Discharge Planning  Goal: Discharge to home or other facility with appropriate resources  10/14/2024 0433 by Jag Anaya RN  Outcome: Progressing  Flowsheets (Taken 10/13/2024 2034)  Discharge to home or other facility with appropriate resources:   Identify barriers to discharge with patient and caregiver   Arrange for needed discharge resources and transportation as appropriate   Identify discharge learning needs (meds, wound care, etc)   Refer to discharge planning if patient needs post-hospital services based on physician order or complex needs related to functional status, cognitive ability or social support system     Problem: Pain  Goal: Verbalizes/displays adequate comfort level or baseline comfort level  10/14/2024 0433 by Jag Anaya RN  Outcome: Progressing     Problem: Safety - Adult  Goal: Free from fall injury  10/14/2024 0433 by Jag Anaya RN  Outcome: Progressing     Problem: ABCDS Injury Assessment  Goal: Absence of physical injury  10/14/2024 0433 by Jag Anaya RN  Outcome: Progressing     Problem: Neurosensory - Adult  Goal: Achieves stable or improved neurological status  10/14/2024 0433 by Jag Anaya RN  Outcome: Progressing  Flowsheets (Taken 10/13/2024 2034)  Achieves stable or improved neurological status:   Assess for and report changes in neurological status   Initiate measures to prevent increased intracranial pressure     Problem: Musculoskeletal - Adult  Goal: Return mobility to safest level of function  10/14/2024 0433 by Jag Anaya RN  Outcome: Progressing  Flowsheets (Taken 10/13/2024 2034)  Return Mobility to Safest Level of Function:   Assess patient stability and activity tolerance for standing, transferring and ambulating with or without assistive devices   Assist with transfers and ambulation using safe patient handling equipment as needed   Ensure adequate 
Da Silva, Haidyn, RN  Outcome: Progressing  Flowsheets (Taken 10/14/2024 0800)  Maintain proper alignment of affected body part:   Support and protect limb and body alignment per provider's orders   Instruct and reinforce with patient and family use of appropriate assistive device and precautions (e.g. spinal or hip dislocation precautions)  10/14/2024 0433 by Jag Anaya RN  Outcome: Progressing  Flowsheets (Taken 10/13/2024 2034)  Maintain proper alignment of affected body part: Support and protect limb and body alignment per provider's orders  Goal: Return ADL status to a safe level of function  10/14/2024 1224 by Jer Da Silva RN  Outcome: Progressing  Flowsheets (Taken 10/14/2024 0800)  Return ADL Status to a Safe Level of Function:   Administer medication as ordered   Assess activities of daily living deficits and provide assistive devices as needed   Obtain physical therapy/occupational therapy consults as needed  10/14/2024 0433 by Jag Anaya RN  Outcome: Progressing  Flowsheets (Taken 10/13/2024 2034)  Return ADL Status to a Safe Level of Function:   Administer medication as ordered   Obtain physical therapy/occupational therapy consults as needed     Problem: Genitourinary - Adult  Goal: Absence of urinary retention  10/14/2024 1224 by Jer Da Silva RN  Outcome: Progressing  Flowsheets (Taken 10/14/2024 0800)  Absence of urinary retention:   Assess patient’s ability to void and empty bladder   Monitor intake/output and perform bladder scan as needed  10/14/2024 0433 by Jag Anaya RN  Outcome: Progressing  Flowsheets (Taken 10/13/2024 2034)  Absence of urinary retention:   Assess patient’s ability to void and empty bladder   Monitor intake/output and perform bladder scan as needed     Problem: Skin/Tissue Integrity - Adult  Goal: Skin integrity remains intact  10/14/2024 1224 by Jer Da Silva RN  Outcome: Progressing  Flowsheets (Taken 10/14/2024 0800)  Skin Integrity

## 2024-10-15 NOTE — PROGRESS NOTES
Progress Note    Patient:  Leticai George  YOB: 1950     74 y.o. female    Subjective:  Patient seen and examined  Pain is controlled.  Patient is concerned about getting out of bed stating \"my entire right side is down\" referring to right shoulder pain, right hip fracture, right ankle fracture.    Objective:   Vitals:    10/15/24 0446   BP: 124/60   Pulse: 56   Resp: 17   Temp: 98.1 °F (36.7 °C)   SpO2: 98%     Gen: NAD, cooperative   MSK: Some limitations in range of motion of the right shoulder is appreciated.  Full range of motion of right elbow hand and fingers is noted.  Dressings right hip very intact.  Boot right ankle is intact.  Recent Labs     10/12/24  1802 10/13/24  1046   WBC 5.7  --    HGB 11.2*  --    HCT 36.7  --      --    INR  --  1.2    138   K 4.5 4.4   BUN 30* 25*   CREATININE 1.0* 1.1*   GLUCOSE 108* 117*      Meds:    See rec for complete list    Impression/plan: 74 y.o. female s/p percutaneous screw fixation right hip fracture and nonoperative treatment right ankle fracture, POD# 1      -WB status is toe-touch weightbearing with a flatfoot to the right lower extremity.  If the patient were to put more than toe-touch weightbearing that would also be permissible but she should protect her weightbearing to the right lower extremity with a walker.  Physical therapy and Occupational Therapy to work with the patient this morning.  I spoke with the patient at length about the importance of mobilizing today with therapy even if the shoulder hurts.  X-rays of the shoulder were once again reviewed and no overt issues or complications appreciated at this time.  Patient notes understanding.  -Rehab placement would be anticipated for this patient.      Paddy Mccracken DO, MATTHEWAO  7:50 AM 10/15/2024             
  Orthopedic Progress Note     Patient:  Leticia George  YOB: 1950     74 y.o. female    Subjective  Patient seen and examined.  No complaints or concerns.  No issue overnight.  Pain controlled.  Denies fever, HA, CP, SOB, N/V, dysuria.  To meet with PT/OT post-op.     Vitals reviewed, afebrile.    Objective  Vitals:    10/15/24 0010   BP: 127/63   Pulse: 58   Resp: 16   Temp: 97.2 °F (36.2 °C)   SpO2: 96%     Gen: NAD, Cooperative.   Cardiovascular: Regular Rate  Respiratory: No Acute Respiratory Distress  MSK:  RLE  Dressings C/D/I. CAM boot on. Compartments soft. EHL/FHL/TA/GSC motor intact. Sural, Saphenous, Superficial/Deep Peroneal, and Plantar Nerve distribution SILT. Foot warm and well perfused.    Recent Labs     10/12/24  1802 10/13/24  1046   WBC 5.7  --    HGB 11.2*  --    HCT 36.7  --      --    INR  --  1.2    138   K 4.5 4.4   BUN 30* 25*   CREATININE 1.0* 1.1*   GLUCOSE 108* 117*      Meds: See Rec for Complete List    mpression 74 y.o. female being seen for:     - Right femoral neck fracture  - Right lateral malleolus fracture     DOS: 10/14/24 (POD#1)  - Closed reduction percutaneous screw fixation right femoral neck fracture  - Closed treatment of right lateral malleolus fracture     Plan  - No plan for further Orthopaedic intervention at this time.  - WB status: TTWB RLE; please maintain CAM boot to RLE when ambulating. OK to remove CAM boot for ankle ROM and hygiene.  - Dressings about RLE, please maintain and reinforce as needed  - Primary Team: Internal Medicine    - Antibiotics:  Post-Op Ancef   - DVT ppx: OK to resume POD#1 from Orthopaedic perspective  - Ice for Pain and Swelling  - PT/OT to evaluate, appreciate recommendations.  - Dispo: OK to discharge from orthopedic perspective, pending PT/OT post-op evaluation and recs, and when medically appropriate.  - F/u Dr. Mccracken's office 10/28/24 at 7:30am   - Please page the On-Call-Ortho-Resident with any 
Called report to [facility] and spoke to Daisy. Report was given and all questions were answered.   
[facility] here to transport patient to [facility]. Patient packet handed to . Patient agreeable to transport, leaving with all personal belongings. Patient stable at discharge.   
[facility]  Office: [phone]  Lucio Chaudhari DO, Asif Bonilla DO, Marlon Sifuentes DO, Danilo Mccain DO, Grover Brito MD, Aysha Boston MD, Liliam Messina MD, Dana Montoya MD,  Juice Poole MD, Gil Garza MD, Thaddeus Cloud MD,  Bernabe Rehman DO, Ryder Rodriguez MD, Phu Maldonado MD, Som Chaudhari DO, Keely Richards MD,  Jorge Luis Braun DO, Sondra Hanson MD, Bouchra Gibbs MD, Chanel Hernandez MD, Etienne Tolbert MD,  Ford Adrian MD, Ron Washington MD, Reina Cheng MD, Anand Mathis MD, Isai Lagunas MD, Marlene Valerio MD, Rashawn Macias DO, Dhruv Spicer MD, Shirley Waterhouse, CNP,  Diandra Kitchen CNP, Rashawn Jj, CNP,  Tyra Pena, JEAN, Meghann Ferraro, CNP, Lynette Titus, CNP, Jojo García, CNP, Kelly Burris, CNP, Sade Becker PACharlieC, DOLORES LiuC, Marta Sam, CNP, Quin Romo, CNP,  Tamie Olivas, CNP, Jessica Lacy, CNP,  Carmen Chinchilla, CNP, Maida Marcus, CNP         [facility]   IN-PATIENT SERVICE   [facility]    Progress Note    10/13/2024    9:50 AM    Name:   Leticia George  MRN:     [id number]     Acct:      [id number]   Room:   2012/2012-02  IP Day:  1  Admit Date:  10/12/2024  2:16 PM    PCP:   Cortney Chaudhari MD  Code Status:  Full Code    Subjective:     C/C:   Chief Complaint   Patient presents with    Fall     RT knee buckled and fell on RT elbow    Shoulder Injury     RT     Interval History Status: not changed.     Patient resting in bed with RN at bedside. She is concerned about needing to go to rehab as she is unable to use her right leg or right shoulder from accident. No fevers, chills, chest pain, shortness of breath, nausea, vomiting and diarrhea.     Brief History:     S/P mechanical fall where she states she was attempting to step out of the truck and missed her step. She denies hitting her head but does complain of right leg and right shoulder discomfort.  She does have a past medical 
Occupational Therapy  [facility]  Occupational Therapy Not Seen Note    Patient not available for Occupational Therapy due to:    [] Testing:    [] Hemodialysis    [] Cancelled by RN:    []Refusal by Patient:    [] Surgery:     [] Intubation:     [] Pain Medication:    [] Sedation:     [] Spine Precautions :    [] Medical Instability:    [x] Other:10/14 cx eval as per chart sx this date; continue to follow        
[facility]  Office: [phone]  Lucio Chaudhari DO, Asif Bonilla DO, Marlon Sifuentes DO, Danilo Mccain DO, Grover Brito MD, Aysha Boston MD, Liliam Messina MD, Dana Montoya MD,  Juice Poole MD, Gil Garza MD, Thaddeus Cloud MD,  Bernabe Rehman DO, Ryder Rodriguez MD, Phu Maldonado MD, Som Chaudhari DO, Keely Richards MD,  Jorge Luis Braun DO, Sondra Hanson MD, Bouchra Gibbs MD, Chanel Hernandez MD, Etienne Tolbert MD,  Ford Adrian MD, Ron Washington MD, Reina Cheng MD, nAand Mathis MD, Isai Lagunas MD, Marlene Valerio MD, Rashawn Macias DO, Dhruv Spicer MD, Shirley Waterhouse, CNP,  Diandra Kitchen, CNP, Rashawn Jj, CNP,  Tyra Pena, JEAN, Meghann Ferraro, CNP, Lynette Titus, CNP, Jojo García, CNP, Kelly Burris, CNP, Sade Becker PACharlieC, DOLORES LiuC, Marta Sam, CNP, Quin Romo, CNP,  Tamie Olivas, CNP, Jessica Lacy, CNP,  Carmen Chinchilla, CNP, Maida Marcus, CNP         [facility]   IN-PATIENT SERVICE   [facility]    Progress Note    10/15/2024    8:19 AM    Name:   Leticia George  MRN:     [id number]     Acct:      [id number]   Room:   2012/2012-02  IP Day:  3  Admit Date:  10/12/2024  2:16 PM    PCP:   Cortney Chaudhari MD  Code Status:  Full Code    Subjective:     C/C:   Chief Complaint   Patient presents with    Fall     RT knee buckled and fell on RT elbow    Shoulder Injury     RT     Interval History Status: not changed.     Has right ankle and leg pain, and also right shoulder pain.  Ortho evaluated shoulder, no fracture seen.  Had surgery on right femur and right ankle fracture is nonsurgical        Review of Systems:     Constitutional:  negative for chills, fevers, sweats  Respiratory:  negative for cough, dyspnea on exertion, shortness of breath, wheezing  Cardiovascular:  negative for chest pain, chest pressure/discomfort, lower extremity edema, palpitations  Gastrointestinal:  negative for abdominal 
Physical Therapy  DATE: 10/13/2024    NAME: Leticia George  MRN: [id number]   : 1950    Patient not seen this date for Physical Therapy due to:      [] Cancel by RN or physician due to:    [] Hemodialysis    [] Critical Lab Value Level     [] Blood transfusion in progress    [] Acute or unstable cardiovascular status   _MAP < 55 or more than >115  _HR < 40 or > 130    [] Acute or unstable pulmonary status   -FiO2 > 60%   _RR < 5 or >40    _O2 sats < 85%    [] Strict Bedrest    [] Off Unit for surgery or procedure    [] Off Unit for testing       [] Pending imaging to R/O fracture    [] Refusal by Patient      [x] Other Patient has femur fx, on strict bedrest awaiting ortho consult.     [] PT being discontinued at this time. Patient independent. No further needs.     [] PT being discontinued at this time as the patient has been transferred to hospice care. No further needs.      Apolonia Rubalcava, PT      
Pt was admitted to room 2012 from the ER.  Pt  was assessed. Pt was oriented to room room and suurounding.  Bed was placed in lowest position and pt knows to use call light if she needs anything.    
R femoral neck fx. OR when able to be cleared by primary. Planning for Monday. NPO after midnight and hold anticoag. Full progress note to follow. 
Tele ordered but there are none available.   
[facility]   Medication Review    The patient's list of current home medications has been reviewed.     Source(s) of information: spoke to patient and sure scripts     Based on information provided by the above source(s), I have updated the patient's home med list as described below.       I changed or updated the following medications on the patient's home medication list:  Removed enalapril-hydroCHLOROthiazide (VASERETIC) 10-25 MG per tablet     Added Glycerin (OPTASE DRY EYE INTENSE OP)  buPROPion (WELLBUTRIN XL) 150 MG extended release tablet  enalapril (VASOTEC) 10 MG tablet  hydroCHLOROthiazide (HYDRODIURIL) 25 MG tablet  traMADol (ULTRAM) 50 MG tablet  Cholecalciferol (VITAMIN D-3 PO)  POTASSIUM PO  Coenzyme Q10 (CO Q-10) 100 MG CAPS  zinc gluconate 50 MG tablet     Adjusted   None      Other Notes None            Please feel free to call me with any questions about this encounter. Thank you.    This note will be reviewed and co-signed by the [facility] Pharmacist. The pharmacist will review inpatient orders and contact the physician about any discrepancies.    Sanjay Gottlieb, pharmacy technician  [facility] Service  Phone:  [phone]  Fax: [phone]      Electronically signed by Sanjay Gottlieb on 10/15/2024 at 12:43 PM       Prior to Admission medications    Medication Sig   Glycerin (OPTASE DRY EYE INTENSE OP) Place 1 drop into both eyes 4 times daily   buPROPion (WELLBUTRIN XL) 150 MG extended release tablet Take 1 tablet by mouth every morning   enalapril (VASOTEC) 10 MG tablet Take 1 tablet by mouth daily   hydroCHLOROthiazide (HYDRODIURIL) 25 MG tablet Take 1 tablet by mouth daily   traMADol (ULTRAM) 50 MG tablet Take 1 tablet by mouth every 8 hours as needed for Pain. Max Daily Amount: 150 mg     Cholecalciferol (VITAMIN D-3 PO) Take 1 tablet by mouth daily   POTASSIUM PO Take 1 tablet by mouth daily     Coenzyme Q10 (CO Q-10) 100 MG CAPS 
[] Medication Reconciliation was completed and the patient's home medication list was verified. The Med List Status has been marked \"Complete\". The following sources were used to assist with Medication Reconciliation:    [x] Patient had a list of medications which was transcribed into the EHR.       
railing?: Total  AM-PAC Inpatient Mobility Raw Score : 9  AM-[facility] Inpatient T-Scale Score : 30.55  Mobility Inpatient CMS 0-100% Score: 81.38  Mobility Inpatient CMS G-Code Modifier : CM         Tinneti Score       Goals  Short Term Goals  Time Frame for Short Term Goals: 12 visits:  Short Term Goal 1: Pt. to require mod A for bed mobility  Short Term Goal 2: Pt. to require mod A x2 for sit to stand transfers with RW from minimally raised bed, TTWB Rt. LE (see WB restriction notes above.)  Short Term Goal 3: Pt. to tolerate 25+ min. of PT daily for ther ex/ balance training/ functional mob. training  Short Term Goal 4: Pt. to have good+ dynamic sitting balance  Short Term Goal 5: Pt. to be positioned for comfort and pressure relief of bony prominences and educated to participate in own pressure relief to maintain skin integrity  Additional Goals?: Yes       Education  Patient Education  Education Given To: Patient  Education Provided: Role of Therapy;Plan of Care;Home Exercise Program  Education Provided Comments: impt. of OOB, walker safety, see EX section, TTWB  Education Method: Demonstration;Verbal  Barriers to Learning: None  Education Outcome: Verbalized understanding;Demonstrated understanding;Continued education needed      Therapy Time   Individual Concurrent Group Co-treatment   Time In 0905         Time Out 1008         Minutes 63          Treatment time:  53min.   Co-treatment with OT warranted secondary to decreased safety and independence requiring 2 skilled therapy professionals to address individual discipline's goals.     MARTÍN LOREDO, PT         
abnormality. 3. Mild degenerative change of the right AC joint. Right hip: 1. Lucency at the right femoral neck.  Further evaluation with right hip radiographs recommended to assess for a femoral neck fracture. 2. Mild bilateral hip osteoarthrosis. 3. Severe stool burden. Right ankle: 1. Acute oblique distal fibular diaphyseal fracture. 2. Moderate soft tissue edema of the right leg. 3. Degenerative changes and sequela of remote trauma. 4. Severe plantar calcaneal spur.  Pes planus.     XR HIP 2-3 VW W PELVIS LEFT    Result Date: 10/12/2024  Chest: 1. Cardiomegaly.  No acute focal airspace consolidation. 2. Bilateral shoulder arthroplasty hardware. Right shoulder: 1. Right shoulder arthroplasty. 2. Mild degenerative change of the right AC joint. 3. No significant periprosthetic lucency or acute osseous abnormality. Right humerus: 1. Right shoulder arthroplasty. 2. No significant periprosthetic lucency or acute osseous abnormality. 3. Mild degenerative change of the right AC joint. Right hip: 1. Lucency at the right femoral neck.  Further evaluation with right hip radiographs recommended to assess for a femoral neck fracture. 2. Mild bilateral hip osteoarthrosis. 3. Severe stool burden. Right ankle: 1. Acute oblique distal fibular diaphyseal fracture. 2. Moderate soft tissue edema of the right leg. 3. Degenerative changes and sequela of remote trauma. 4. Severe plantar calcaneal spur.  Pes planus.       Physical Examination:        Physical Exam  Vitals and nursing note reviewed.   Constitutional:       General: She is not in acute distress.     Appearance: Normal appearance. She is obese. She is not ill-appearing.   HENT:      Head: Normocephalic.      Mouth/Throat:      Mouth: Mucous membranes are moist.   Eyes:      Pupils: Pupils are equal, round, and reactive to light.   Cardiovascular:      Rate and Rhythm: Normal rate and regular rhythm.      Pulses: Normal pulses.      Heart sounds: Murmur heard.      No 
concerns    Cognition  Overall Cognitive Status: [facility]  Cognition Comment: Very talkative- needs redirection and encouragement at times as pt is reluctant to complete tasks as independently as possible at times.  Orientation  Overall Orientation Status: Within Functional Limits                  Education Given To: Patient  Education Provided: Role of Therapy;Plan of Care;Home Exercise Program;ADL Adaptive Strategies;Energy Conservation;Transfer Training;Equipment;Mobility Training;Fall Prevention Strategies;Precautions  Education Method: Demonstration;Verbal;Teach Back  Barriers to Learning: None  Education Outcome: Continued education needed     AM-PAC - ADL  AM-PAC Daily Activity - Inpatient   How much help is needed for putting on and taking off regular lower body clothing?: Total  How much help is needed for bathing (which includes washing, rinsing, drying)?: Total  How much help is needed for toileting (which includes using toilet, bedpan, or urinal)?: A Lot  How much help is needed for putting on and taking off regular upper body clothing?: A Lot  How much help is needed for taking care of personal grooming?: A Little  How much help for eating meals?: None  AM-[facility] Inpatient Daily Activity Raw Score: 13  AM-PAC Inpatient ADL T-Scale Score : 32.03  ADL Inpatient CMS 0-100% Score: 63.03  ADL Inpatient CMS G-Code Modifier : CL    Goals  Short Term Goals  Time Frame for Short Term Goals: By discharge, Pt will  Short Term Goal 1: demo & verb good understanding of education provided on ADL techniques, breathing techniques, RW safety/management,  WB restrictions, EC/WS, equipment needs, and d/c recommendations.  Short Term Goal 2: demo toileting tasks with ModA with use of AD/grab bars as needed.  Short Term Goal 3: demo UB ADLs with Garrick and LB ADLs with Garrick with AE as needed to increase independence.  Short Term Goal 4: demo ADL transfers with AD as needed with ModA x1 with good safety and pacing to increase

## 2024-10-16 ENCOUNTER — HOSPITAL ENCOUNTER (OUTPATIENT)
Age: 74
Setting detail: SPECIMEN
Discharge: HOME | End: 2024-10-16

## 2024-10-16 LAB
ALBUMIN SERPL-MCNC: 3.4 G/DL (ref 3.5–5.2)
ALP SERPL-CCNC: 91 U/L (ref 35–104)
ALT SERPL-CCNC: 8 U/L (ref 5–33)
ANION GAP SERPL CALCULATED.3IONS-SCNC: 10 MMOL/L (ref 9–17)
AST SERPL-CCNC: 16 U/L
BASOPHILS # BLD: 0.03 K/UL (ref 0–0.2)
BASOPHILS NFR BLD: 1 % (ref 0–2)
BILIRUB SERPL-MCNC: 0.7 MG/DL (ref 0.3–1.2)
BUN SERPL-MCNC: 24 MG/DL (ref 8–23)
BUN/CREAT SERPL: 22 (ref 9–20)
CALCIUM SERPL-MCNC: 9.2 MG/DL (ref 8.6–10.4)
CHLORIDE SERPL-SCNC: 102 MMOL/L (ref 98–107)
CO2 SERPL-SCNC: 26 MMOL/L (ref 20–31)
CREAT SERPL-MCNC: 1.1 MG/DL (ref 0.5–0.9)
EOSINOPHIL # BLD: 0.05 K/UL (ref 0–0.44)
EOSINOPHILS RELATIVE PERCENT: 1 % (ref 1–4)
ERYTHROCYTE [DISTWIDTH] IN BLOOD BY AUTOMATED COUNT: 17.6 % (ref 11.8–14.4)
GFR, ESTIMATED: 53 ML/MIN/1.73M2
GLUCOSE SERPL-MCNC: 103 MG/DL (ref 70–99)
HCT VFR BLD AUTO: 34.6 % (ref 36.3–47.1)
HGB BLD-MCNC: 10.3 G/DL (ref 11.9–15.1)
IMM GRANULOCYTES # BLD AUTO: 0 K/UL (ref 0–0.3)
IMM GRANULOCYTES NFR BLD: 0 %
LYMPHOCYTES NFR BLD: 1.21 K/UL (ref 1.1–3.7)
LYMPHOCYTES RELATIVE PERCENT: 28 % (ref 24–43)
MAGNESIUM SERPL-MCNC: 1.9 MG/DL (ref 1.6–2.6)
MCH RBC QN AUTO: 25.3 PG (ref 25.2–33.5)
MCHC RBC AUTO-ENTMCNC: 29.8 G/DL (ref 28.4–34.8)
MCV RBC AUTO: 85 FL (ref 82.6–102.9)
MONOCYTES NFR BLD: 0.3 K/UL (ref 0.1–1.2)
MONOCYTES NFR BLD: 7 % (ref 3–12)
NEUTROPHILS NFR BLD: 63 % (ref 36–65)
NEUTS SEG NFR BLD: 2.8 K/UL (ref 1.5–8.1)
NRBC BLD-RTO: 0 PER 100 WBC
PHOSPHATE SERPL-MCNC: 3.4 MG/DL (ref 2.6–4.5)
PLATELET # BLD AUTO: 145 K/UL (ref 138–453)
PMV BLD AUTO: 10.3 FL (ref 8.1–13.5)
POTASSIUM SERPL-SCNC: 4.5 MMOL/L (ref 3.7–5.3)
PROT SERPL-MCNC: 5.7 G/DL (ref 6.4–8.3)
RBC # BLD AUTO: 4.07 M/UL (ref 3.95–5.11)
RBC # BLD: ABNORMAL 10*6/UL
SODIUM SERPL-SCNC: 138 MMOL/L (ref 135–144)
WBC OTHER # BLD: 4.4 K/UL (ref 3.5–11.3)

## 2024-10-16 PROCEDURE — 36415 COLL VENOUS BLD VENIPUNCTURE: CPT

## 2024-10-16 PROCEDURE — 84100 ASSAY OF PHOSPHORUS: CPT

## 2024-10-16 PROCEDURE — P9603 ONE-WAY ALLOW PRORATED MILES: HCPCS

## 2024-10-16 PROCEDURE — 80053 COMPREHEN METABOLIC PANEL: CPT

## 2024-10-16 PROCEDURE — 83735 ASSAY OF MAGNESIUM: CPT

## 2024-10-16 PROCEDURE — 85025 COMPLETE CBC W/AUTO DIFF WBC: CPT

## 2024-10-18 ENCOUNTER — HOSPITAL ENCOUNTER (OUTPATIENT)
Age: 74
Setting detail: SPECIMEN
Discharge: HOME | End: 2024-10-18

## 2024-10-18 LAB
ALBUMIN SERPL-MCNC: 3.2 G/DL (ref 3.5–5.2)
ALP SERPL-CCNC: 93 U/L (ref 35–104)
ALT SERPL-CCNC: 12 U/L (ref 5–33)
ANION GAP SERPL CALCULATED.3IONS-SCNC: 10 MMOL/L (ref 9–17)
AST SERPL-CCNC: 20 U/L
BASOPHILS # BLD: 0.03 K/UL (ref 0–0.2)
BASOPHILS NFR BLD: 1 % (ref 0–2)
BILIRUB SERPL-MCNC: 0.9 MG/DL (ref 0.3–1.2)
BUN SERPL-MCNC: 26 MG/DL (ref 8–23)
BUN/CREAT SERPL: 26 (ref 9–20)
CALCIUM SERPL-MCNC: 8.7 MG/DL (ref 8.6–10.4)
CHLORIDE SERPL-SCNC: 102 MMOL/L (ref 98–107)
CO2 SERPL-SCNC: 24 MMOL/L (ref 20–31)
CREAT SERPL-MCNC: 1 MG/DL (ref 0.5–0.9)
EOSINOPHIL # BLD: 0.07 K/UL (ref 0–0.44)
EOSINOPHILS RELATIVE PERCENT: 2 % (ref 1–4)
ERYTHROCYTE [DISTWIDTH] IN BLOOD BY AUTOMATED COUNT: 18.1 % (ref 11.8–14.4)
GFR, ESTIMATED: 59 ML/MIN/1.73M2
GLUCOSE SERPL-MCNC: 125 MG/DL (ref 70–99)
HCT VFR BLD AUTO: 35.7 % (ref 36.3–47.1)
HGB BLD-MCNC: 10.4 G/DL (ref 11.9–15.1)
IMM GRANULOCYTES # BLD AUTO: 0.02 K/UL (ref 0–0.3)
IMM GRANULOCYTES NFR BLD: 1 %
LYMPHOCYTES NFR BLD: 0.88 K/UL (ref 1.1–3.7)
LYMPHOCYTES RELATIVE PERCENT: 25 % (ref 24–43)
MCH RBC QN AUTO: 25.7 PG (ref 25.2–33.5)
MCHC RBC AUTO-ENTMCNC: 29.1 G/DL (ref 28.4–34.8)
MCV RBC AUTO: 88.4 FL (ref 82.6–102.9)
MONOCYTES NFR BLD: 0.26 K/UL (ref 0.1–1.2)
MONOCYTES NFR BLD: 7 % (ref 3–12)
NEUTROPHILS NFR BLD: 64 % (ref 36–65)
NEUTS SEG NFR BLD: 2.33 K/UL (ref 1.5–8.1)
NRBC BLD-RTO: 0 PER 100 WBC
PLATELET # BLD AUTO: 158 K/UL (ref 138–453)
PMV BLD AUTO: 11 FL (ref 8.1–13.5)
POTASSIUM SERPL-SCNC: 3.9 MMOL/L (ref 3.7–5.3)
PROT SERPL-MCNC: 5.6 G/DL (ref 6.4–8.3)
RBC # BLD AUTO: 4.04 M/UL (ref 3.95–5.11)
RBC # BLD: ABNORMAL 10*6/UL
SODIUM SERPL-SCNC: 136 MMOL/L (ref 135–144)
WBC OTHER # BLD: 3.6 K/UL (ref 3.5–11.3)

## 2024-10-18 PROCEDURE — 80053 COMPREHEN METABOLIC PANEL: CPT

## 2024-10-18 PROCEDURE — 36415 COLL VENOUS BLD VENIPUNCTURE: CPT

## 2024-10-18 PROCEDURE — 85025 COMPLETE CBC W/AUTO DIFF WBC: CPT

## 2024-10-18 PROCEDURE — P9603 ONE-WAY ALLOW PRORATED MILES: HCPCS

## 2024-10-22 ENCOUNTER — HOSPITAL ENCOUNTER (OUTPATIENT)
Age: 74
Setting detail: SPECIMEN
Discharge: HOME | End: 2024-10-22

## 2024-10-22 LAB
ALBUMIN SERPL-MCNC: 3.4 G/DL (ref 3.5–5.2)
ALP SERPL-CCNC: 96 U/L (ref 35–104)
ALT SERPL-CCNC: 19 U/L (ref 5–33)
ANION GAP SERPL CALCULATED.3IONS-SCNC: 12 MMOL/L (ref 9–17)
AST SERPL-CCNC: 23 U/L
BASOPHILS # BLD: 0.03 K/UL (ref 0–0.2)
BASOPHILS NFR BLD: 1 % (ref 0–2)
BILIRUB SERPL-MCNC: 0.9 MG/DL (ref 0.3–1.2)
BUN SERPL-MCNC: 22 MG/DL (ref 8–23)
BUN/CREAT SERPL: 22 (ref 9–20)
CALCIUM SERPL-MCNC: 8.8 MG/DL (ref 8.6–10.4)
CHLORIDE SERPL-SCNC: 102 MMOL/L (ref 98–107)
CO2 SERPL-SCNC: 24 MMOL/L (ref 20–31)
CREAT SERPL-MCNC: 1 MG/DL (ref 0.5–0.9)
EOSINOPHIL # BLD: 0.06 K/UL (ref 0–0.44)
EOSINOPHILS RELATIVE PERCENT: 2 % (ref 1–4)
ERYTHROCYTE [DISTWIDTH] IN BLOOD BY AUTOMATED COUNT: 18.5 % (ref 11.8–14.4)
GFR, ESTIMATED: 59 ML/MIN/1.73M2
GLUCOSE SERPL-MCNC: 97 MG/DL (ref 70–99)
HCT VFR BLD AUTO: 34.4 % (ref 36.3–47.1)
HGB BLD-MCNC: 10.6 G/DL (ref 11.9–15.1)
IMM GRANULOCYTES # BLD AUTO: 0.01 K/UL (ref 0–0.3)
IMM GRANULOCYTES NFR BLD: 0 %
LYMPHOCYTES NFR BLD: 1 K/UL (ref 1.1–3.7)
LYMPHOCYTES RELATIVE PERCENT: 32 % (ref 24–43)
MCH RBC QN AUTO: 26.4 PG (ref 25.2–33.5)
MCHC RBC AUTO-ENTMCNC: 30.8 G/DL (ref 28.4–34.8)
MCV RBC AUTO: 85.6 FL (ref 82.6–102.9)
MONOCYTES NFR BLD: 0.3 K/UL (ref 0.1–1.2)
MONOCYTES NFR BLD: 10 % (ref 3–12)
NEUTROPHILS NFR BLD: 55 % (ref 36–65)
NEUTS SEG NFR BLD: 1.77 K/UL (ref 1.5–8.1)
NRBC BLD-RTO: 0 PER 100 WBC
PLATELET # BLD AUTO: 150 K/UL (ref 138–453)
PMV BLD AUTO: 10.5 FL (ref 8.1–13.5)
POTASSIUM SERPL-SCNC: 3.6 MMOL/L (ref 3.7–5.3)
PROT SERPL-MCNC: 5.7 G/DL (ref 6.4–8.3)
RBC # BLD AUTO: 4.02 M/UL (ref 3.95–5.11)
RBC # BLD: ABNORMAL 10*6/UL
SODIUM SERPL-SCNC: 138 MMOL/L (ref 135–144)
WBC OTHER # BLD: 3.2 K/UL (ref 3.5–11.3)

## 2024-10-22 PROCEDURE — 36415 COLL VENOUS BLD VENIPUNCTURE: CPT

## 2024-10-22 PROCEDURE — P9603 ONE-WAY ALLOW PRORATED MILES: HCPCS

## 2024-10-22 PROCEDURE — 85025 COMPLETE CBC W/AUTO DIFF WBC: CPT

## 2024-10-22 PROCEDURE — 80053 COMPREHEN METABOLIC PANEL: CPT

## 2024-10-25 ENCOUNTER — HOSPITAL ENCOUNTER (OUTPATIENT)
Age: 74
Setting detail: SPECIMEN
Discharge: HOME | End: 2024-10-25

## 2024-10-25 LAB
ALBUMIN SERPL-MCNC: 3.6 G/DL (ref 3.5–5.2)
ALP SERPL-CCNC: 105 U/L (ref 35–104)
ALT SERPL-CCNC: 26 U/L (ref 5–33)
ANION GAP SERPL CALCULATED.3IONS-SCNC: 10 MMOL/L (ref 9–17)
AST SERPL-CCNC: 24 U/L
BASOPHILS # BLD: 0.03 K/UL (ref 0–0.2)
BASOPHILS NFR BLD: 1 % (ref 0–2)
BILIRUB SERPL-MCNC: 0.8 MG/DL (ref 0.3–1.2)
BUN SERPL-MCNC: 22 MG/DL (ref 8–23)
BUN/CREAT SERPL: 22 (ref 9–20)
CALCIUM SERPL-MCNC: 9.1 MG/DL (ref 8.6–10.4)
CHLORIDE SERPL-SCNC: 101 MMOL/L (ref 98–107)
CO2 SERPL-SCNC: 25 MMOL/L (ref 20–31)
CREAT SERPL-MCNC: 1 MG/DL (ref 0.5–0.9)
EOSINOPHIL # BLD: 0.07 K/UL (ref 0–0.44)
EOSINOPHILS RELATIVE PERCENT: 2 % (ref 1–4)
ERYTHROCYTE [DISTWIDTH] IN BLOOD BY AUTOMATED COUNT: 19.3 % (ref 11.8–14.4)
GFR, ESTIMATED: 59 ML/MIN/1.73M2
GLUCOSE SERPL-MCNC: 87 MG/DL (ref 70–99)
HCT VFR BLD AUTO: 36.8 % (ref 36.3–47.1)
HGB BLD-MCNC: 10.8 G/DL (ref 11.9–15.1)
IMM GRANULOCYTES # BLD AUTO: 0.01 K/UL (ref 0–0.3)
IMM GRANULOCYTES NFR BLD: 0 %
LYMPHOCYTES NFR BLD: 0.81 K/UL (ref 1.1–3.7)
LYMPHOCYTES RELATIVE PERCENT: 25 % (ref 24–43)
MCH RBC QN AUTO: 25.7 PG (ref 25.2–33.5)
MCHC RBC AUTO-ENTMCNC: 29.3 G/DL (ref 28.4–34.8)
MCV RBC AUTO: 87.4 FL (ref 82.6–102.9)
MONOCYTES NFR BLD: 0.25 K/UL (ref 0.1–1.2)
MONOCYTES NFR BLD: 8 % (ref 3–12)
NEUTROPHILS NFR BLD: 64 % (ref 36–65)
NEUTS SEG NFR BLD: 2.14 K/UL (ref 1.5–8.1)
NRBC BLD-RTO: 0 PER 100 WBC
PLATELET # BLD AUTO: 175 K/UL (ref 138–453)
PMV BLD AUTO: 10.7 FL (ref 8.1–13.5)
POTASSIUM SERPL-SCNC: 3.9 MMOL/L (ref 3.7–5.3)
PROT SERPL-MCNC: 6.1 G/DL (ref 6.4–8.3)
RBC # BLD AUTO: 4.21 M/UL (ref 3.95–5.11)
RBC # BLD: ABNORMAL 10*6/UL
SODIUM SERPL-SCNC: 136 MMOL/L (ref 135–144)
WBC OTHER # BLD: 3.3 K/UL (ref 3.5–11.3)

## 2024-10-25 PROCEDURE — P9603 ONE-WAY ALLOW PRORATED MILES: HCPCS

## 2024-10-25 PROCEDURE — 85025 COMPLETE CBC W/AUTO DIFF WBC: CPT

## 2024-10-25 PROCEDURE — 80053 COMPREHEN METABOLIC PANEL: CPT

## 2024-10-25 PROCEDURE — 36415 COLL VENOUS BLD VENIPUNCTURE: CPT

## 2024-10-28 ENCOUNTER — OFFICE VISIT (OUTPATIENT)
Dept: ORTHOPEDIC SURGERY | Age: 74
End: 2024-10-28
Payer: MEDICARE

## 2024-10-28 VITALS — WEIGHT: 293 LBS | HEIGHT: 66 IN | BODY MASS INDEX: 47.09 KG/M2

## 2024-10-28 DIAGNOSIS — M25.511 ACUTE PAIN OF RIGHT SHOULDER: ICD-10-CM

## 2024-10-28 DIAGNOSIS — Z96.611 HISTORY OF TOTAL SHOULDER REPLACEMENT, RIGHT: ICD-10-CM

## 2024-10-28 DIAGNOSIS — S82.891D CLOSED FRACTURE OF RIGHT ANKLE WITH ROUTINE HEALING, SUBSEQUENT ENCOUNTER: Primary | ICD-10-CM

## 2024-10-28 DIAGNOSIS — M12.811 ROTATOR CUFF ARTHROPATHY, RIGHT: ICD-10-CM

## 2024-10-28 DIAGNOSIS — S72.001A CLOSED FRACTURE OF NECK OF RIGHT FEMUR, INITIAL ENCOUNTER (HCC): ICD-10-CM

## 2024-10-28 DIAGNOSIS — Z09 S/P ORTHOPEDIC SURGERY, FOLLOW-UP EXAM: ICD-10-CM

## 2024-10-28 PROCEDURE — 1159F MED LIST DOCD IN RCRD: CPT | Performed by: PHYSICIAN ASSISTANT

## 2024-10-28 PROCEDURE — G8417 CALC BMI ABV UP PARAM F/U: HCPCS | Performed by: PHYSICIAN ASSISTANT

## 2024-10-28 PROCEDURE — 3017F COLORECTAL CA SCREEN DOC REV: CPT | Performed by: PHYSICIAN ASSISTANT

## 2024-10-28 PROCEDURE — 1111F DSCHRG MED/CURRENT MED MERGE: CPT | Performed by: PHYSICIAN ASSISTANT

## 2024-10-28 PROCEDURE — 1123F ACP DISCUSS/DSCN MKR DOCD: CPT | Performed by: PHYSICIAN ASSISTANT

## 2024-10-28 PROCEDURE — 1090F PRES/ABSN URINE INCON ASSESS: CPT | Performed by: PHYSICIAN ASSISTANT

## 2024-10-28 PROCEDURE — G8427 DOCREV CUR MEDS BY ELIG CLIN: HCPCS | Performed by: PHYSICIAN ASSISTANT

## 2024-10-28 PROCEDURE — G8484 FLU IMMUNIZE NO ADMIN: HCPCS | Performed by: PHYSICIAN ASSISTANT

## 2024-10-28 PROCEDURE — 1036F TOBACCO NON-USER: CPT | Performed by: PHYSICIAN ASSISTANT

## 2024-10-28 PROCEDURE — 99215 OFFICE O/P EST HI 40 MIN: CPT | Performed by: PHYSICIAN ASSISTANT

## 2024-10-28 PROCEDURE — 1125F AMNT PAIN NOTED PAIN PRSNT: CPT | Performed by: PHYSICIAN ASSISTANT

## 2024-10-28 PROCEDURE — G8400 PT W/DXA NO RESULTS DOC: HCPCS | Performed by: PHYSICIAN ASSISTANT

## 2024-10-28 ASSESSMENT — ENCOUNTER SYMPTOMS
COUGH: 0
COLOR CHANGE: 0
SHORTNESS OF BREATH: 0
VOMITING: 0

## 2024-10-28 NOTE — PROGRESS NOTES
encounter ([facility])    3. S/P orthopedic surgery, follow-up exam    4. Acute pain of right shoulder    5. History of total shoulder replacement, right    6. Rotator cuff arthropathy, right         Plan:   Assessment & Plan     Leticia George is a 74 y.o. female who is 2 weeks post operative after undergoing a right hip closed reduction and percutaneous screw fixation of the right hip on 10/14/2024 with Dr Paddy Mccracken DO.  In regards to the right hip the patient is doing well and she has minimal pain. The staples were removed from the anterior lateral hip today in office and the incision is healing well without complication appreciated.    We were unable to transfer the patient from the wheelchair to the x-ray table for a right hip x-ray due to the fact that she did not have a chiquita lift pad under her or a slide board to be able to transfer to the table. Therefore an order was placed for  the facility to obtain xrays of the right hip - a letter was printed for the patient to return to the facility with the XR orders and information where to send images and impression result.    In regards to the right ankle, the fracture has maintained alignment without evidence of displacement.  The patient is to remain in the CAM walking boot and she may continue to remain toe touch weight bearing on the right lower extremity while using a walker.     In regards to the right shoulder - she likely has a traumatic rotator cuff injury after the fall on 10/12/2024.  She can work with physical therapy for the right ankle right hip and right shoulder range of motion only.    The patient will follow up with Dr Mccracken in 1-2 weeks after x-rays of the right hip x-rays are completed by the [facility].  The patient was instructed to call our office with any questions or concerns prior to the next appointment.  The patient noted her understanding.          Follow up: Return in about 9 days (around 11/6/2024) for post-operative follow up,

## 2024-10-29 ENCOUNTER — HOSPITAL ENCOUNTER (OUTPATIENT)
Age: 74
Setting detail: SPECIMEN
Discharge: HOME | End: 2024-10-29

## 2024-11-04 DIAGNOSIS — S82.891D CLOSED FRACTURE OF RIGHT ANKLE WITH ROUTINE HEALING, SUBSEQUENT ENCOUNTER: Primary | ICD-10-CM

## 2024-11-05 ENCOUNTER — TELEPHONE (OUTPATIENT)
Dept: ORTHOPEDIC SURGERY | Age: 74
End: 2024-11-05

## 2024-11-05 NOTE — TELEPHONE ENCOUNTER
DOS 10/14/2024 Rt hip, Rt lateral malleolus fx        Samantha, assistant director of nursing called in regarding patient appointment 11/6/2024 with Dr. Mccracken and the need to email xrays for review. Provided office phone number about possibly emailing the xrays. Unable to provide secure email address.  Samantha phone number is [phone]

## 2024-11-06 ENCOUNTER — OFFICE VISIT (OUTPATIENT)
Dept: ORTHOPEDIC SURGERY | Age: 74
End: 2024-11-06

## 2024-11-06 VITALS — HEIGHT: 66 IN | OXYGEN SATURATION: 99 % | BODY MASS INDEX: 47.09 KG/M2 | RESPIRATION RATE: 17 BRPM | WEIGHT: 293 LBS

## 2024-11-06 DIAGNOSIS — M25.511 ACUTE PAIN OF RIGHT SHOULDER: ICD-10-CM

## 2024-11-06 DIAGNOSIS — S82.61XA CLOSED LOW LATERAL MALLEOLUS FRACTURE, RIGHT, INITIAL ENCOUNTER: ICD-10-CM

## 2024-11-06 DIAGNOSIS — S82.891D CLOSED FRACTURE OF RIGHT ANKLE WITH ROUTINE HEALING, SUBSEQUENT ENCOUNTER: Primary | ICD-10-CM

## 2024-11-06 PROCEDURE — 99024 POSTOP FOLLOW-UP VISIT: CPT | Performed by: ORTHOPAEDIC SURGERY

## 2024-11-06 NOTE — PROGRESS NOTES
right hip indicates a right femoral neck fracture with moderate healing. The femoral head is in alignment with the acetabular fossa, and there is surgical fixation of the right femoral neck fracture with moderate callus formation. She reports no pain in her hip and is actively able to flex her right hip without significant pain. Negative hip log roll test. She is permitted to bear weight as tolerated, provided she wears the boot for her ankle. A repeat x-ray of her right shoulder, hip, and ankle will be scheduled in 6 weeks.    2. Right ankle lateral malleolus fracture.  The x-rays of her ankle taken today showed no abnormalities. Tenderness is noted to the lateral malleolus of the right ankle, while the medial malleolus is fine. She is advised to continue with her rehabilitation exercises for her ankle. A platform will be installed on the right side of her walker to facilitate easier use with her shoulder.    3. Suspected right rotator cuff tear.  The previous x-rays of her shoulder, taken during her hospital stay, revealed a well-aligned shoulder replacement with no signs of loosening or fractures. It is suspected that she may have sustained a rotator cuff tear during her fall. Forward elevation of the right shoulder is 10 degrees actively, and abduction is 60 degrees. She is able to demonstrate active assist flexion on the right to about 110 degrees. She is advised to continue with her rehabilitation exercises for her shoulder. Surgery is not recommended at this time, and rehabilitation may continue for up to a year to see improvement.    Follow-up  Return in 6 weeks for follow up.           Follow up: No follow-ups on file.    No orders of the defined types were placed in this encounter.      No orders of the defined types were placed in this encounter.      The patient (or guardian, if applicable) and other individuals in attendance with the patient were advised that Artificial Intelligence will be utilized

## 2024-11-06 NOTE — TELEPHONE ENCOUNTER
DOS 10/14/2024 Closed reduction percutaneous screw fixation right hip. Patient had office visit on 10/28/24 with June and office was unable to obtain right hip images, orders were given to have xrays of right hip done at facility and for the disc/images to come along with patient for appointment on 11/6/24. Samantha from [facility] stated that they did not have disc but she could email the images for you to review.    Email address provided to Samantha for her to send the right hip images.

## 2024-11-06 NOTE — PATIENT INSTRUCTIONS
PATIENTIQ:  PatientIQ helps [facility] stay in touch with you to know how you're feeling, and provides education and care instructions to you at various time points.   Your answers help your care team track your progress to provide the best care possible. PatientIQ will contact you pre-op and post-op via email or text with:  Educational Videos and Care Instructions  Questionnaires About How You're Feeling    Your participation provides you valuable education and helps [facility] continue to provide quality care to all patients. Thank you

## 2024-11-06 NOTE — TELEPHONE ENCOUNTER
Called Samantha to provide email address for Dr. Mccracken to send xrays. No answer left [facility]

## 2024-11-07 ASSESSMENT — ENCOUNTER SYMPTOMS
SHORTNESS OF BREATH: 0
ABDOMINAL PAIN: 0
ROS SKIN COMMENTS: NEGATIVE FOR RASH
EYE DISCHARGE: 0

## 2024-11-22 ENCOUNTER — TELEPHONE (OUTPATIENT)
Dept: ORTHOPEDIC SURGERY | Age: 74
End: 2024-11-22

## 2024-11-22 NOTE — TELEPHONE ENCOUNTER
Pt would like to know if she can remove her boot for showering at this stage. DOS 10/14, date of injury 10/12. Please call her to advise.

## 2024-11-22 NOTE — TELEPHONE ENCOUNTER
Called and Let the patient know that it is perfectly fine for her to take her boot off to shower and/or to sleep. She is allowed to take the boot off to shower on he day that she received it. She would also wash the inside covering prior to replacing it back on her leg/foot for support.

## 2024-12-04 ENCOUNTER — HOSPITAL ENCOUNTER (OUTPATIENT)
Age: 74
Setting detail: SPECIMEN
Discharge: HOME | End: 2024-12-04

## 2024-12-04 LAB
ALBUMIN SERPL-MCNC: 4.4 G/DL (ref 3.5–5.2)
ALBUMIN/GLOB SERPL: 1.7 {RATIO} (ref 1–2.5)
ALP SERPL-CCNC: 115 U/L (ref 35–104)
ALT SERPL-CCNC: 17 U/L (ref 10–35)
ANION GAP SERPL CALCULATED.3IONS-SCNC: 13 MMOL/L (ref 9–16)
AST SERPL-CCNC: 21 U/L (ref 10–35)
BILIRUB SERPL-MCNC: 1 MG/DL (ref 0–1.2)
BUN SERPL-MCNC: 26 MG/DL (ref 8–23)
CALCIUM SERPL-MCNC: 9.6 MG/DL (ref 8.6–10.4)
CHLORIDE SERPL-SCNC: 105 MMOL/L (ref 98–107)
CHOLEST SERPL-MCNC: 165 MG/DL (ref 0–199)
CHOLESTEROL/HDL RATIO: 3.8
CO2 SERPL-SCNC: 23 MMOL/L (ref 20–31)
CREAT SERPL-MCNC: 1.3 MG/DL (ref 0.6–0.9)
EST. AVERAGE GLUCOSE BLD GHB EST-MCNC: 91 MG/DL
GFR, ESTIMATED: 43 ML/MIN/1.73M2
GLUCOSE SERPL-MCNC: 99 MG/DL (ref 74–99)
HBA1C MFR BLD: 4.8 % (ref 4–6)
HDLC SERPL-MCNC: 44 MG/DL
LDLC SERPL CALC-MCNC: 71 MG/DL (ref 0–100)
POTASSIUM SERPL-SCNC: 4.7 MMOL/L (ref 3.7–5.3)
PROT SERPL-MCNC: 7 G/DL (ref 6.6–8.7)
SODIUM SERPL-SCNC: 141 MMOL/L (ref 136–145)
TRIGL SERPL-MCNC: 251 MG/DL (ref 0–149)
TSH SERPL DL<=0.05 MIU/L-ACNC: 1.97 UIU/ML (ref 0.27–4.2)
VLDLC SERPL CALC-MCNC: 50 MG/DL (ref 1–30)

## 2024-12-19 ENCOUNTER — OFFICE VISIT (OUTPATIENT)
Dept: ORTHOPEDIC SURGERY | Age: 74
End: 2024-12-19
Payer: MEDICARE

## 2024-12-19 VITALS — BODY MASS INDEX: 47.09 KG/M2 | WEIGHT: 293 LBS | HEIGHT: 66 IN | RESPIRATION RATE: 20 BRPM

## 2024-12-19 DIAGNOSIS — Z98.890 STATUS POST-OPERATIVE REPAIR OF CLOSED FRACTURE OF RIGHT HIP: Primary | ICD-10-CM

## 2024-12-19 DIAGNOSIS — S72.001A CLOSED FRACTURE OF NECK OF RIGHT FEMUR, INITIAL ENCOUNTER (HCC): Primary | ICD-10-CM

## 2024-12-19 DIAGNOSIS — S82.61XA CLOSED LOW LATERAL MALLEOLUS FRACTURE, RIGHT, INITIAL ENCOUNTER: ICD-10-CM

## 2024-12-19 DIAGNOSIS — Z96.611 HISTORY OF TOTAL SHOULDER REPLACEMENT, RIGHT: ICD-10-CM

## 2024-12-19 DIAGNOSIS — S82.891D CLOSED FRACTURE OF RIGHT ANKLE WITH ROUTINE HEALING, SUBSEQUENT ENCOUNTER: Primary | ICD-10-CM

## 2024-12-19 DIAGNOSIS — Z87.81 STATUS POST-OPERATIVE REPAIR OF CLOSED FRACTURE OF RIGHT HIP: Primary | ICD-10-CM

## 2024-12-19 PROCEDURE — 1090F PRES/ABSN URINE INCON ASSESS: CPT

## 2024-12-19 PROCEDURE — G8428 CUR MEDS NOT DOCUMENT: HCPCS

## 2024-12-19 PROCEDURE — 1123F ACP DISCUSS/DSCN MKR DOCD: CPT

## 2024-12-19 PROCEDURE — G8400 PT W/DXA NO RESULTS DOC: HCPCS

## 2024-12-19 PROCEDURE — 99214 OFFICE O/P EST MOD 30 MIN: CPT

## 2024-12-19 PROCEDURE — G8484 FLU IMMUNIZE NO ADMIN: HCPCS

## 2024-12-19 PROCEDURE — G8417 CALC BMI ABV UP PARAM F/U: HCPCS

## 2024-12-19 PROCEDURE — 1036F TOBACCO NON-USER: CPT

## 2024-12-19 PROCEDURE — 3017F COLORECTAL CA SCREEN DOC REV: CPT

## 2024-12-19 NOTE — PROGRESS NOTES
[facility] ORTHOPEDICS AND SPORTS MEDICINE  [address]  [place] [id number]  Dept: [phone]  Dept Fax: [phone]        Postoperative follow-up note    Subjective:   Leticia George is a 74 y.o. year old female who presents to our office today for postoperative followup regarding her   1. Status post-operative repair of closed fracture of right hip    2. History of total shoulder replacement, right    3. Closed low lateral malleolus fracture, right, initial encounter    .    Chief Complaint   Patient presents with    Hip Pain     Right femur fracture dos  10/14/24    Ankle Pain     Right ankle fracture     Shoulder Pain     Right shoulder injury       History of Present Illness  The patient is a 74-year-old female who presents for a follow up of right hip, ankle fracture, and right shoulder pain.    She underwent a right hip closed reduction and percutaneous screw fixation on 10/14/2024, performed by Dr. Mccracken. She reports occasional mild discomfort in the hip but overall satisfactory progress. She has been home for 2 weeks following her rehabilitation stay. She has completed physical therapy and is currently using a walker for mobility, having previously relied on a cane. She has not attempted to return to using the cane due to instability caused by the boot on the right foot. She is not taking any pain medications other than Tylenol, which she administers twice daily for general aches and pains.     She sustained an ankle fracture on 10/12/2024, the same day as her hip injury, resulting from a fall. She has been wearing a boot since the incident and is weightbearing as tolerated with the aid of a walker. Approximately 2 weeks ago, she began removing the boot for showering and has since started walking short distances without it, including a 10-foot walk from her bedroom to the bathroom at night. She has also managed to

## 2025-01-10 DIAGNOSIS — Z87.81 STATUS POST-OPERATIVE REPAIR OF CLOSED FRACTURE OF RIGHT HIP: Primary | ICD-10-CM

## 2025-01-10 DIAGNOSIS — Z98.890 STATUS POST-OPERATIVE REPAIR OF CLOSED FRACTURE OF RIGHT HIP: Primary | ICD-10-CM

## 2025-01-24 DIAGNOSIS — Z87.81 STATUS POST-OPERATIVE REPAIR OF CLOSED FRACTURE OF RIGHT HIP: ICD-10-CM

## 2025-01-24 DIAGNOSIS — Z98.890 STATUS POST-OPERATIVE REPAIR OF CLOSED FRACTURE OF RIGHT HIP: ICD-10-CM

## 2025-01-28 ENCOUNTER — OFFICE VISIT (OUTPATIENT)
Dept: ORTHOPEDIC SURGERY | Age: 75
End: 2025-01-28
Payer: MEDICARE

## 2025-01-28 VITALS — HEIGHT: 66 IN | RESPIRATION RATE: 16 BRPM | BODY MASS INDEX: 47.09 KG/M2 | WEIGHT: 293 LBS | OXYGEN SATURATION: 100 %

## 2025-01-28 DIAGNOSIS — S82.891D CLOSED FRACTURE OF RIGHT ANKLE WITH ROUTINE HEALING, SUBSEQUENT ENCOUNTER: ICD-10-CM

## 2025-01-28 DIAGNOSIS — Z98.890 STATUS POST-OPERATIVE REPAIR OF CLOSED FRACTURE OF RIGHT HIP: Primary | ICD-10-CM

## 2025-01-28 DIAGNOSIS — Z87.81 STATUS POST-OPERATIVE REPAIR OF CLOSED FRACTURE OF RIGHT HIP: Primary | ICD-10-CM

## 2025-01-28 PROCEDURE — G8417 CALC BMI ABV UP PARAM F/U: HCPCS

## 2025-01-28 PROCEDURE — 99213 OFFICE O/P EST LOW 20 MIN: CPT

## 2025-01-28 PROCEDURE — 3017F COLORECTAL CA SCREEN DOC REV: CPT

## 2025-01-28 PROCEDURE — G8427 DOCREV CUR MEDS BY ELIG CLIN: HCPCS

## 2025-01-28 PROCEDURE — G8400 PT W/DXA NO RESULTS DOC: HCPCS

## 2025-01-28 PROCEDURE — 1123F ACP DISCUSS/DSCN MKR DOCD: CPT

## 2025-01-28 PROCEDURE — 1126F AMNT PAIN NOTED NONE PRSNT: CPT

## 2025-01-28 PROCEDURE — 1159F MED LIST DOCD IN RCRD: CPT

## 2025-01-28 PROCEDURE — 1036F TOBACCO NON-USER: CPT

## 2025-01-28 PROCEDURE — 1090F PRES/ABSN URINE INCON ASSESS: CPT

## 2025-01-28 ASSESSMENT — ENCOUNTER SYMPTOMS: COLOR CHANGE: 0

## 2025-01-28 NOTE — PROGRESS NOTES
[facility] ORTHOPEDICS AND SPORTS MEDICINE  [address]  [facility] [place] [facility] [place] [address]  Dept: [phone]  Dept Fax: [phone]        Postoperative follow-up note    Subjective:   Leticia George is a 74 y.o. year old female who presents to our office today for postoperative followup regarding her   1. Status post-operative repair of closed fracture of right hip    2. Closed fracture of right ankle with routine healing, subsequent encounter    .    Chief Complaint   Patient presents with    Hip Pain     Right, S/P Right hip closed reduction percutaneous screw fixation on 10/14/24.    Ankle Pain     Right, Right ankle fracture DOI: 10/12/24    Shoulder Pain     Right, Chronic right shoulder pain         History of Present Illness  The patient is a 74-year-old female who presents for follow-up of her right proximal femur fracture, right ankle fracture, and right shoulder pain.    She was last seen on 12/19/2024 for a follow-up of her right proximal femur fracture, which was surgically treated with closed reduction and percutaneous screw fixation by Dr. Mccracken on 10/14/2024. She is now 3 months out from the date of the surgery. She also sustained a right ankle fracture on the same day, managed with a closed manipulation of the ankle and walking boot. She reports no current hip pain but experiences occasional discomfort during the initial steps of walking. She reports no recent injuries and has been ambulating with a cane. She has not been taking any analgesics for pain management.    She has not followed up with the surgeon who performed her right total shoulder arthroplasty. She plans to consult with Dr. Raines in the future. She is still having shoulder pain as well as limited range of motion. She is not having any ankle pain.    She had a cardiac catheterization 2 weeks ago and spent 5 days in the hospital. She will start cardiac rehab

## 2025-02-14 ENCOUNTER — HOSPITAL ENCOUNTER (OUTPATIENT)
Age: 75
Discharge: HOME | End: 2025-02-14

## 2025-02-20 ENCOUNTER — HOSPITAL ENCOUNTER (OUTPATIENT)
Age: 75
Setting detail: SPECIMEN
Discharge: HOME | End: 2025-02-20

## 2025-02-20 LAB
CLOSURE TME COLL+ADP BLD: 111 SEC (ref 67–112)
COLLAGEN EPINEPHRINE TIME: 123 SEC (ref 85–172)
PLATELET FUNCTION INTERP: NORMAL

## 2025-04-24 ENCOUNTER — HOSPITAL ENCOUNTER (OUTPATIENT)
Age: 75
Setting detail: SPECIMEN
Discharge: HOME | End: 2025-04-24

## 2025-04-24 LAB
CHOLEST SERPL-MCNC: 139 MG/DL (ref 0–199)
CHOLESTEROL/HDL RATIO: 3
HDLC SERPL-MCNC: 46 MG/DL
LDLC SERPL CALC-MCNC: 66 MG/DL (ref 0–100)
TRIGL SERPL-MCNC: 134 MG/DL (ref 0–149)
VLDLC SERPL CALC-MCNC: 27 MG/DL (ref 1–30)

## 2025-04-27 ENCOUNTER — TRANSCRIBE ORDERS (OUTPATIENT)
Dept: ADMINISTRATIVE | Age: 75
End: 2025-04-27

## 2025-04-27 DIAGNOSIS — Z12.31 SCREENING MAMMOGRAM FOR BREAST CANCER: Primary | ICD-10-CM

## 2025-04-27 DIAGNOSIS — Z78.0 POST-MENOPAUSAL: Primary | ICD-10-CM

## (undated) DEVICE — C-ARMOR C-ARM EQUIPMENT COVERS CLEAR STERILE UNIVERSAL FIT 12 PER CASE: Brand: C-ARMOR

## (undated) DEVICE — BIT DRL L300MM DIA5MM CANN QUIK CPL ADJ STP REUSE

## (undated) DEVICE — Device

## (undated) DEVICE — GLOVE SURG SZ 65 CRM LTX FREE POLYISOPRENE POLYMER BEAD ANTI

## (undated) DEVICE — GLOVE SURG SZ 85 CRM LTX FREE POLYISOPRENE POLYMER BEAD ANTI

## (undated) DEVICE — GUIDEWIRE ORTH L300MM DIA2.8MM S STL THRD SHRP TRCR TIP FOR

## (undated) DEVICE — 4-PORT MANIFOLD: Brand: NEPTUNE 2

## (undated) DEVICE — ELECTRODE PT RET AD L9FT HI MOIST COND ADH HYDRGEL CORDED

## (undated) DEVICE — C-ARM: Brand: UNBRANDED

## (undated) DEVICE — GOWN,SIRUS,NONRNF,SETINSLV,XL,20/CS: Brand: MEDLINE

## (undated) DEVICE — GLOVE SURG SZ 6 L12IN FNGR THK79MIL GRN LTX FREE

## (undated) DEVICE — GLOVE SURG SZ 65 L12IN FNGR THK79MIL GRN LTX FREE

## (undated) DEVICE — GOWN,AURORA,NON-REINFORCED,2XL: Brand: MEDLINE

## (undated) DEVICE — STAZ MAJOR BASIN: Brand: MEDLINE INDUSTRIES, INC.

## (undated) DEVICE — LIMB HOLDER, WRIST/ANKLE: Brand: DEROYAL

## (undated) DEVICE — SYRINGE IRRIG 60ML SFT PLIABLE BLB EZ TO GRP 1 HND USE W/

## (undated) DEVICE — GLOVE SURG SZ 8 CRM LTX FREE POLYISOPRENE POLYMER BEAD ANTI

## (undated) DEVICE — DRESSING FOAM W4XL4IN AG SIL FACE BORD IONIC ANTIMIC ADH

## (undated) DEVICE — GOWN,SIRUS,NON REINFRCD,LARGE,SET IN SL: Brand: MEDLINE

## (undated) DEVICE — 6619 2 PTNT ISO SYS INCISE AREA&LT;(&GT;&&LT;)&GT;P: Brand: STERI-DRAPE™ IOBAN™ 2

## (undated) DEVICE — SOLUTION IRRIG 1000ML 0.9% SOD CHL USP POUR PLAS BTL